# Patient Record
Sex: MALE | Race: OTHER | Employment: FULL TIME | URBAN - METROPOLITAN AREA
[De-identification: names, ages, dates, MRNs, and addresses within clinical notes are randomized per-mention and may not be internally consistent; named-entity substitution may affect disease eponyms.]

---

## 2018-05-22 ENCOUNTER — OFFICE VISIT (OUTPATIENT)
Dept: FAMILY MEDICINE CLINIC | Age: 53
End: 2018-05-22
Payer: COMMERCIAL

## 2018-05-22 VITALS
RESPIRATION RATE: 16 BRPM | BODY MASS INDEX: 29.82 KG/M2 | WEIGHT: 239.8 LBS | DIASTOLIC BLOOD PRESSURE: 64 MMHG | HEIGHT: 75 IN | HEART RATE: 72 BPM | TEMPERATURE: 98.1 F | SYSTOLIC BLOOD PRESSURE: 118 MMHG

## 2018-05-22 DIAGNOSIS — Z12.11 COLON CANCER SCREENING: ICD-10-CM

## 2018-05-22 DIAGNOSIS — L30.8 PSORIASIFORM DERMATITIS: ICD-10-CM

## 2018-05-22 DIAGNOSIS — G89.29 BILATERAL CHRONIC KNEE PAIN: ICD-10-CM

## 2018-05-22 DIAGNOSIS — Z00.00 HEALTHCARE MAINTENANCE: Primary | ICD-10-CM

## 2018-05-22 DIAGNOSIS — L91.8 CUTANEOUS SKIN TAGS: ICD-10-CM

## 2018-05-22 DIAGNOSIS — M25.562 BILATERAL CHRONIC KNEE PAIN: ICD-10-CM

## 2018-05-22 DIAGNOSIS — M25.561 BILATERAL CHRONIC KNEE PAIN: ICD-10-CM

## 2018-05-22 PROCEDURE — 99386 PREV VISIT NEW AGE 40-64: CPT | Performed by: FAMILY MEDICINE

## 2018-05-22 RX ORDER — GLUCOSAMINE SULFATE 500 MG
1 CAPSULE ORAL DAILY
COMMUNITY

## 2018-05-22 RX ORDER — MOMETASONE FUROATE 1 MG/G
CREAM TOPICAL
Qty: 45 G | Refills: 0 | Status: SHIPPED | OUTPATIENT
Start: 2018-05-22

## 2018-05-22 ASSESSMENT — PATIENT HEALTH QUESTIONNAIRE - PHQ9
1. LITTLE INTEREST OR PLEASURE IN DOING THINGS: 0
SUM OF ALL RESPONSES TO PHQ9 QUESTIONS 1 & 2: 0
SUM OF ALL RESPONSES TO PHQ QUESTIONS 1-9: 0
2. FEELING DOWN, DEPRESSED OR HOPELESS: 0

## 2018-05-30 ENCOUNTER — OFFICE VISIT (OUTPATIENT)
Dept: FAMILY MEDICINE CLINIC | Age: 53
End: 2018-05-30
Payer: COMMERCIAL

## 2018-05-30 VITALS
BODY MASS INDEX: 29.52 KG/M2 | SYSTOLIC BLOOD PRESSURE: 128 MMHG | HEIGHT: 75 IN | HEART RATE: 68 BPM | TEMPERATURE: 97.9 F | DIASTOLIC BLOOD PRESSURE: 72 MMHG | WEIGHT: 237.4 LBS

## 2018-05-30 DIAGNOSIS — R20.8 OTHER DISTURBANCES OF SKIN SENSATION: ICD-10-CM

## 2018-05-30 DIAGNOSIS — L91.8 CUTANEOUS SKIN TAGS: ICD-10-CM

## 2018-05-30 DIAGNOSIS — L91.8 INFLAMED SKIN TAG: Primary | ICD-10-CM

## 2018-05-30 DIAGNOSIS — Z00.00 HEALTHCARE MAINTENANCE: ICD-10-CM

## 2018-05-30 LAB
ALBUMIN SERPL-MCNC: 4.5 G/DL (ref 3.9–4.9)
ALP BLD-CCNC: 67 U/L (ref 35–104)
ALT SERPL-CCNC: 32 U/L (ref 0–41)
ANION GAP SERPL CALCULATED.3IONS-SCNC: 17 MEQ/L (ref 7–13)
AST SERPL-CCNC: 22 U/L (ref 0–40)
BASOPHILS ABSOLUTE: 0.1 K/UL (ref 0–0.2)
BASOPHILS RELATIVE PERCENT: 1.2 %
BILIRUB SERPL-MCNC: 0.5 MG/DL (ref 0–1.2)
BUN BLDV-MCNC: 16 MG/DL (ref 6–20)
CALCIUM SERPL-MCNC: 9.1 MG/DL (ref 8.6–10.2)
CHLORIDE BLD-SCNC: 97 MEQ/L (ref 98–107)
CHOLESTEROL, TOTAL: 248 MG/DL (ref 0–199)
CO2: 24 MEQ/L (ref 22–29)
CREAT SERPL-MCNC: 0.68 MG/DL (ref 0.7–1.2)
EOSINOPHILS ABSOLUTE: 0.2 K/UL (ref 0–0.7)
EOSINOPHILS RELATIVE PERCENT: 2.8 %
GFR AFRICAN AMERICAN: >60
GFR NON-AFRICAN AMERICAN: >60
GLOBULIN: 3.2 G/DL (ref 2.3–3.5)
GLUCOSE BLD-MCNC: 80 MG/DL (ref 74–109)
HCT VFR BLD CALC: 46.3 % (ref 42–52)
HDLC SERPL-MCNC: 52 MG/DL (ref 40–59)
HEMOGLOBIN: 15.3 G/DL (ref 14–18)
LDL CHOLESTEROL CALCULATED: 171 MG/DL (ref 0–129)
LYMPHOCYTES ABSOLUTE: 1.5 K/UL (ref 1–4.8)
LYMPHOCYTES RELATIVE PERCENT: 25.5 %
MCH RBC QN AUTO: 28.3 PG (ref 27–31.3)
MCHC RBC AUTO-ENTMCNC: 33 % (ref 33–37)
MCV RBC AUTO: 85.8 FL (ref 80–100)
MONOCYTES ABSOLUTE: 0.4 K/UL (ref 0.2–0.8)
MONOCYTES RELATIVE PERCENT: 7.2 %
NEUTROPHILS ABSOLUTE: 3.7 K/UL (ref 1.4–6.5)
NEUTROPHILS RELATIVE PERCENT: 63.3 %
PDW BLD-RTO: 14.2 % (ref 11.5–14.5)
PLATELET # BLD: 261 K/UL (ref 130–400)
POTASSIUM SERPL-SCNC: 4.6 MEQ/L (ref 3.5–5.1)
RBC # BLD: 5.4 M/UL (ref 4.7–6.1)
SODIUM BLD-SCNC: 138 MEQ/L (ref 132–144)
TOTAL PROTEIN: 7.7 G/DL (ref 6.4–8.1)
TRIGL SERPL-MCNC: 124 MG/DL (ref 0–200)
WBC # BLD: 5.8 K/UL (ref 4.8–10.8)

## 2018-05-30 PROCEDURE — 11200 RMVL SKIN TAGS UP TO&INC 15: CPT | Performed by: FAMILY MEDICINE

## 2018-05-31 DIAGNOSIS — E78.00 ELEVATED LDL CHOLESTEROL LEVEL: Primary | ICD-10-CM

## 2019-03-07 ENCOUNTER — TELEPHONE (OUTPATIENT)
Dept: FAMILY MEDICINE CLINIC | Age: 54
End: 2019-03-07

## 2021-04-29 ENCOUNTER — HOSPITAL ENCOUNTER (INPATIENT)
Age: 56
LOS: 1 days | Discharge: PSYCHIATRIC OR SUBSTANCE USE FACILITY | DRG: 885 | End: 2021-05-01
Attending: STUDENT IN AN ORGANIZED HEALTH CARE EDUCATION/TRAINING PROGRAM | Admitting: INTERNAL MEDICINE
Payer: COMMERCIAL

## 2021-04-29 ENCOUNTER — APPOINTMENT (OUTPATIENT)
Dept: CT IMAGING | Age: 56
DRG: 885 | End: 2021-04-29
Payer: COMMERCIAL

## 2021-04-29 DIAGNOSIS — R91.1 LUNG NODULE: ICD-10-CM

## 2021-04-29 DIAGNOSIS — R91.8 RIGHT LOWER LOBE LUNG MASS: ICD-10-CM

## 2021-04-29 DIAGNOSIS — R09.02 HYPOXIA: ICD-10-CM

## 2021-04-29 DIAGNOSIS — R56.9 SEIZURE (HCC): ICD-10-CM

## 2021-04-29 DIAGNOSIS — R41.0 DISORIENTATION: Primary | ICD-10-CM

## 2021-04-29 LAB
ALBUMIN SERPL-MCNC: 4.4 G/DL (ref 3.5–4.6)
ALP BLD-CCNC: 51 U/L (ref 35–104)
ALT SERPL-CCNC: 35 U/L (ref 0–41)
AMMONIA: 40 UMOL/L (ref 16–60)
AMPHETAMINE SCREEN, URINE: NORMAL
ANION GAP SERPL CALCULATED.3IONS-SCNC: 12 MEQ/L (ref 9–15)
APTT: 32.9 SEC (ref 24.4–36.8)
AST SERPL-CCNC: 25 U/L (ref 0–40)
BARBITURATE SCREEN URINE: NORMAL
BASE EXCESS ARTERIAL: 0 (ref -3–3)
BASOPHILS ABSOLUTE: 0 K/UL (ref 0–0.2)
BASOPHILS RELATIVE PERCENT: 0.4 %
BENZODIAZEPINE SCREEN, URINE: NORMAL
BILIRUB SERPL-MCNC: 0.5 MG/DL (ref 0.2–0.7)
BILIRUBIN URINE: NEGATIVE
BLOOD, URINE: NEGATIVE
BUN BLDV-MCNC: 7 MG/DL (ref 6–20)
C-REACTIVE PROTEIN, HIGH SENSITIVITY: 2.5 MG/L (ref 0–5)
CALCIUM IONIZED: 1.14 MMOL/L (ref 1.12–1.32)
CALCIUM SERPL-MCNC: 8.6 MG/DL (ref 8.5–9.9)
CANNABINOID SCREEN URINE: NORMAL
CHLORIDE BLD-SCNC: 105 MEQ/L (ref 95–107)
CK MB: 3.4 NG/ML (ref 0–6.7)
CLARITY: CLEAR
CO2: 22 MEQ/L (ref 20–31)
COCAINE METABOLITE SCREEN URINE: NORMAL
COLOR: YELLOW
CREAT SERPL-MCNC: 0.56 MG/DL (ref 0.7–1.2)
CREATINE KINASE-MB INDEX: 1.3 % (ref 0–3.5)
EOSINOPHILS ABSOLUTE: 0 K/UL (ref 0–0.7)
EOSINOPHILS RELATIVE PERCENT: 0.1 %
ETHANOL PERCENT: NORMAL G/DL
ETHANOL: <10 MG/DL (ref 0–0.08)
GFR AFRICAN AMERICAN: >60
GFR AFRICAN AMERICAN: >60
GFR NON-AFRICAN AMERICAN: >60
GFR NON-AFRICAN AMERICAN: >60
GLOBULIN: 3.1 G/DL (ref 2.3–3.5)
GLUCOSE BLD-MCNC: 125 MG/DL (ref 60–115)
GLUCOSE BLD-MCNC: 95 MG/DL (ref 70–99)
GLUCOSE URINE: NEGATIVE MG/DL
HCO3 ARTERIAL: 23.7 MMOL/L (ref 21–29)
HCT VFR BLD CALC: 43.8 % (ref 42–52)
HEMOGLOBIN: 14.5 GM/DL (ref 13.5–17.5)
HEMOGLOBIN: 15 G/DL (ref 14–18)
INFLUENZA A BY PCR: NEGATIVE
INFLUENZA B BY PCR: NEGATIVE
INR BLD: 1.1
KETONES, URINE: ABNORMAL MG/DL
LACTATE: 0.72 MMOL/L (ref 0.4–2)
LACTIC ACID: 2.2 MMOL/L (ref 0.5–2.2)
LEUKOCYTE ESTERASE, URINE: NEGATIVE
LYMPHOCYTES ABSOLUTE: 0.7 K/UL (ref 1–4.8)
LYMPHOCYTES RELATIVE PERCENT: 11.7 %
Lab: NORMAL
MAGNESIUM: 2.3 MG/DL (ref 1.7–2.4)
MCH RBC QN AUTO: 29.3 PG (ref 27–31.3)
MCHC RBC AUTO-ENTMCNC: 34.3 % (ref 33–37)
MCV RBC AUTO: 85.4 FL (ref 80–100)
METHADONE SCREEN, URINE: NORMAL
MONOCYTES ABSOLUTE: 0.4 K/UL (ref 0.2–0.8)
MONOCYTES RELATIVE PERCENT: 6.9 %
NEUTROPHILS ABSOLUTE: 4.6 K/UL (ref 1.4–6.5)
NEUTROPHILS RELATIVE PERCENT: 80.9 %
NITRITE, URINE: NEGATIVE
O2 SAT, ARTERIAL: 93 % (ref 93–100)
OPIATE SCREEN URINE: NORMAL
PCO2 ARTERIAL: 35 MM HG (ref 35–45)
PDW BLD-RTO: 13.7 % (ref 11.5–14.5)
PERFORMED ON: ABNORMAL
PH ARTERIAL: 7.44 (ref 7.35–7.45)
PH UA: 7 (ref 5–9)
PHENCYCLIDINE SCREEN URINE: NORMAL
PLATELET # BLD: 281 K/UL (ref 130–400)
PO2 ARTERIAL: 62 MM HG (ref 75–108)
POC CHLORIDE: 104 MEQ/L (ref 99–110)
POC CREATININE: 0.6 MG/DL (ref 0.9–1.3)
POC HEMATOCRIT: 43 % (ref 41–53)
POC POTASSIUM: 3.8 MEQ/L (ref 3.5–5.1)
POC SAMPLE TYPE: ABNORMAL
POC SODIUM: 139 MEQ/L (ref 136–145)
POTASSIUM SERPL-SCNC: 4 MEQ/L (ref 3.4–4.9)
PRO-BNP: 70 PG/ML
PROCALCITONIN: <0.02 NG/ML (ref 0–0.15)
PROPOXYPHENE SCREEN, URINE: NORMAL
PROTEIN UA: ABNORMAL MG/DL
PROTHROMBIN TIME: 14.4 SEC (ref 12.3–14.9)
RBC # BLD: 5.13 M/UL (ref 4.7–6.1)
REJECTED TEST: NORMAL
SARS-COV-2, NAAT: NOT DETECTED
SODIUM BLD-SCNC: 139 MEQ/L (ref 135–144)
SPECIFIC GRAVITY UA: 1.02 (ref 1–1.03)
STREP GRP A PCR: NEGATIVE
TCO2 ARTERIAL: 25 (ref 22–29)
TOTAL CK: 259 U/L (ref 0–190)
TOTAL PROTEIN: 7.5 G/DL (ref 6.3–8)
TROPONIN: <0.01 NG/ML (ref 0–0.01)
TSH SERPL DL<=0.05 MIU/L-ACNC: 0.87 UIU/ML (ref 0.44–3.86)
UR OXYCODONE RAPID SCREEN: NORMAL
URINE REFLEX TO CULTURE: ABNORMAL
UROBILINOGEN, URINE: 0.2 E.U./DL
WBC # BLD: 5.7 K/UL (ref 4.8–10.8)

## 2021-04-29 PROCEDURE — 6360000002 HC RX W HCPCS: Performed by: NURSE PRACTITIONER

## 2021-04-29 PROCEDURE — 93005 ELECTROCARDIOGRAM TRACING: CPT

## 2021-04-29 PROCEDURE — 87040 BLOOD CULTURE FOR BACTERIA: CPT

## 2021-04-29 PROCEDURE — 87651 STREP A DNA AMP PROBE: CPT

## 2021-04-29 PROCEDURE — 6360000004 HC RX CONTRAST MEDICATION: Performed by: STUDENT IN AN ORGANIZED HEALTH CARE EDUCATION/TRAINING PROGRAM

## 2021-04-29 PROCEDURE — 82550 ASSAY OF CK (CPK): CPT

## 2021-04-29 PROCEDURE — 83735 ASSAY OF MAGNESIUM: CPT

## 2021-04-29 PROCEDURE — 82803 BLOOD GASES ANY COMBINATION: CPT

## 2021-04-29 PROCEDURE — 36600 WITHDRAWAL OF ARTERIAL BLOOD: CPT

## 2021-04-29 PROCEDURE — 84295 ASSAY OF SERUM SODIUM: CPT

## 2021-04-29 PROCEDURE — 85730 THROMBOPLASTIN TIME PARTIAL: CPT

## 2021-04-29 PROCEDURE — 96374 THER/PROPH/DIAG INJ IV PUSH: CPT

## 2021-04-29 PROCEDURE — 81003 URINALYSIS AUTO W/O SCOPE: CPT

## 2021-04-29 PROCEDURE — 85610 PROTHROMBIN TIME: CPT

## 2021-04-29 PROCEDURE — 36415 COLL VENOUS BLD VENIPUNCTURE: CPT

## 2021-04-29 PROCEDURE — 71275 CT ANGIOGRAPHY CHEST: CPT

## 2021-04-29 PROCEDURE — 85014 HEMATOCRIT: CPT

## 2021-04-29 PROCEDURE — G0378 HOSPITAL OBSERVATION PER HR: HCPCS

## 2021-04-29 PROCEDURE — 84443 ASSAY THYROID STIM HORMONE: CPT

## 2021-04-29 PROCEDURE — 82553 CREATINE MB FRACTION: CPT

## 2021-04-29 PROCEDURE — 82565 ASSAY OF CREATININE: CPT

## 2021-04-29 PROCEDURE — 84132 ASSAY OF SERUM POTASSIUM: CPT

## 2021-04-29 PROCEDURE — 82140 ASSAY OF AMMONIA: CPT

## 2021-04-29 PROCEDURE — 99285 EMERGENCY DEPT VISIT HI MDM: CPT

## 2021-04-29 PROCEDURE — 94664 DEMO&/EVAL PT USE INHALER: CPT

## 2021-04-29 PROCEDURE — 87635 SARS-COV-2 COVID-19 AMP PRB: CPT

## 2021-04-29 PROCEDURE — 70450 CT HEAD/BRAIN W/O DYE: CPT

## 2021-04-29 PROCEDURE — 82435 ASSAY OF BLOOD CHLORIDE: CPT

## 2021-04-29 PROCEDURE — 84484 ASSAY OF TROPONIN QUANT: CPT

## 2021-04-29 PROCEDURE — 83880 ASSAY OF NATRIURETIC PEPTIDE: CPT

## 2021-04-29 PROCEDURE — 84145 PROCALCITONIN (PCT): CPT

## 2021-04-29 PROCEDURE — 80306 DRUG TEST PRSMV INSTRMNT: CPT

## 2021-04-29 PROCEDURE — 86141 C-REACTIVE PROTEIN HS: CPT

## 2021-04-29 PROCEDURE — 2580000003 HC RX 258: Performed by: STUDENT IN AN ORGANIZED HEALTH CARE EDUCATION/TRAINING PROGRAM

## 2021-04-29 PROCEDURE — 82330 ASSAY OF CALCIUM: CPT

## 2021-04-29 PROCEDURE — 83605 ASSAY OF LACTIC ACID: CPT

## 2021-04-29 PROCEDURE — 85025 COMPLETE CBC W/AUTO DIFF WBC: CPT

## 2021-04-29 PROCEDURE — 82077 ASSAY SPEC XCP UR&BREATH IA: CPT

## 2021-04-29 PROCEDURE — 80053 COMPREHEN METABOLIC PANEL: CPT

## 2021-04-29 PROCEDURE — 87502 INFLUENZA DNA AMP PROBE: CPT

## 2021-04-29 RX ORDER — FOLIC ACID 1 MG/1
1 TABLET ORAL DAILY
Status: DISCONTINUED | OUTPATIENT
Start: 2021-04-29 | End: 2021-05-01 | Stop reason: HOSPADM

## 2021-04-29 RX ORDER — LORAZEPAM 1 MG/1
4 TABLET ORAL
Status: DISCONTINUED | OUTPATIENT
Start: 2021-04-29 | End: 2021-05-01 | Stop reason: HOSPADM

## 2021-04-29 RX ORDER — LORAZEPAM 2 MG/ML
3 INJECTION INTRAMUSCULAR
Status: DISCONTINUED | OUTPATIENT
Start: 2021-04-29 | End: 2021-05-01 | Stop reason: HOSPADM

## 2021-04-29 RX ORDER — HALOPERIDOL 5 MG/ML
0.5 INJECTION INTRAMUSCULAR EVERY 30 MIN PRN
Status: ACTIVE | OUTPATIENT
Start: 2021-04-29 | End: 2021-04-30

## 2021-04-29 RX ORDER — SODIUM CHLORIDE 9 MG/ML
25 INJECTION, SOLUTION INTRAVENOUS PRN
Status: DISCONTINUED | OUTPATIENT
Start: 2021-04-29 | End: 2021-05-01 | Stop reason: HOSPADM

## 2021-04-29 RX ORDER — LORAZEPAM 2 MG/ML
2 INJECTION INTRAMUSCULAR
Status: DISCONTINUED | OUTPATIENT
Start: 2021-04-29 | End: 2021-05-01 | Stop reason: HOSPADM

## 2021-04-29 RX ORDER — LORAZEPAM 1 MG/1
3 TABLET ORAL
Status: DISCONTINUED | OUTPATIENT
Start: 2021-04-29 | End: 2021-05-01 | Stop reason: HOSPADM

## 2021-04-29 RX ORDER — 0.9 % SODIUM CHLORIDE 0.9 %
1000 INTRAVENOUS SOLUTION INTRAVENOUS ONCE
Status: COMPLETED | OUTPATIENT
Start: 2021-04-29 | End: 2021-04-29

## 2021-04-29 RX ORDER — POLYETHYLENE GLYCOL 3350 17 G/17G
17 POWDER, FOR SOLUTION ORAL DAILY PRN
Status: DISCONTINUED | OUTPATIENT
Start: 2021-04-29 | End: 2021-05-01 | Stop reason: HOSPADM

## 2021-04-29 RX ORDER — SODIUM CHLORIDE 0.9 % (FLUSH) 0.9 %
5-40 SYRINGE (ML) INJECTION EVERY 12 HOURS SCHEDULED
Status: DISCONTINUED | OUTPATIENT
Start: 2021-04-29 | End: 2021-05-01 | Stop reason: HOSPADM

## 2021-04-29 RX ORDER — ACETAMINOPHEN 325 MG/1
650 TABLET ORAL EVERY 6 HOURS PRN
Status: DISCONTINUED | OUTPATIENT
Start: 2021-04-29 | End: 2021-05-01 | Stop reason: HOSPADM

## 2021-04-29 RX ORDER — ONDANSETRON 2 MG/ML
4 INJECTION INTRAMUSCULAR; INTRAVENOUS EVERY 6 HOURS PRN
Status: DISCONTINUED | OUTPATIENT
Start: 2021-04-29 | End: 2021-05-01 | Stop reason: HOSPADM

## 2021-04-29 RX ORDER — LORAZEPAM 2 MG/ML
1 INJECTION INTRAMUSCULAR
Status: DISCONTINUED | OUTPATIENT
Start: 2021-04-29 | End: 2021-05-01 | Stop reason: HOSPADM

## 2021-04-29 RX ORDER — LORAZEPAM 2 MG/ML
4 INJECTION INTRAMUSCULAR
Status: DISCONTINUED | OUTPATIENT
Start: 2021-04-29 | End: 2021-05-01 | Stop reason: HOSPADM

## 2021-04-29 RX ORDER — SODIUM CHLORIDE 0.9 % (FLUSH) 0.9 %
5-40 SYRINGE (ML) INJECTION PRN
Status: DISCONTINUED | OUTPATIENT
Start: 2021-04-29 | End: 2021-05-01 | Stop reason: HOSPADM

## 2021-04-29 RX ORDER — ALBUTEROL SULFATE 2.5 MG/3ML
2.5 SOLUTION RESPIRATORY (INHALATION)
Status: DISCONTINUED | OUTPATIENT
Start: 2021-04-29 | End: 2021-05-01 | Stop reason: HOSPADM

## 2021-04-29 RX ORDER — PROMETHAZINE HYDROCHLORIDE 12.5 MG/1
12.5 TABLET ORAL EVERY 6 HOURS PRN
Status: DISCONTINUED | OUTPATIENT
Start: 2021-04-29 | End: 2021-05-01 | Stop reason: HOSPADM

## 2021-04-29 RX ORDER — ACETAMINOPHEN 650 MG/1
650 SUPPOSITORY RECTAL EVERY 6 HOURS PRN
Status: DISCONTINUED | OUTPATIENT
Start: 2021-04-29 | End: 2021-05-01 | Stop reason: HOSPADM

## 2021-04-29 RX ORDER — LORAZEPAM 1 MG/1
2 TABLET ORAL
Status: DISCONTINUED | OUTPATIENT
Start: 2021-04-29 | End: 2021-05-01 | Stop reason: HOSPADM

## 2021-04-29 RX ORDER — LANOLIN ALCOHOL/MO/W.PET/CERES
100 CREAM (GRAM) TOPICAL DAILY
Status: DISCONTINUED | OUTPATIENT
Start: 2021-04-29 | End: 2021-05-01 | Stop reason: HOSPADM

## 2021-04-29 RX ORDER — M-VIT,TX,IRON,MINS/CALC/FOLIC 27MG-0.4MG
1 TABLET ORAL DAILY
Status: DISCONTINUED | OUTPATIENT
Start: 2021-04-29 | End: 2021-05-01 | Stop reason: HOSPADM

## 2021-04-29 RX ORDER — LORAZEPAM 1 MG/1
1 TABLET ORAL
Status: DISCONTINUED | OUTPATIENT
Start: 2021-04-29 | End: 2021-05-01 | Stop reason: HOSPADM

## 2021-04-29 RX ADMIN — SODIUM CHLORIDE 1000 ML: 9 INJECTION, SOLUTION INTRAVENOUS at 12:13

## 2021-04-29 RX ADMIN — IOPAMIDOL 100 ML: 612 INJECTION, SOLUTION INTRAVENOUS at 12:42

## 2021-04-29 RX ADMIN — LORAZEPAM 2 MG: 2 INJECTION INTRAMUSCULAR; INTRAVENOUS at 20:42

## 2021-04-29 ASSESSMENT — ENCOUNTER SYMPTOMS
RESPIRATORY NEGATIVE: 1
GASTROINTESTINAL NEGATIVE: 1
ALLERGIC/IMMUNOLOGIC NEGATIVE: 1
EYES NEGATIVE: 1

## 2021-04-29 NOTE — PROGRESS NOTES
[facility] AT [place] Respiratory Therapy Evaluation   Current Order:  Albuterol q2 prn     Home Regimen:  none    Ordering Physician: ruben  Re-evaluation Date:5/2     Diagnosis: confusion    Patient Status: Stable / Unstable + Physician notified    The following MDI Criteria must be met in order to convert aerosol to MDI with spacer. If unable to meet, MDI will be converted to aerosol:  []  Patient able to demonstrate the ability to use MDI effectively  []  Patient alert and cooperative  []  Patient able to take deep breath with 5-10 second hold  []  Medication(s) available in this delivery method   []  Peak flow greater than or equal to 200 ml/min            Current Order Substituted To  (same drug, same frequency)   Aerosol to MDI [] Albuterol Sulfate 0.083% unit dose by aerosol Albuterol Sulfate MDI 2 puffs by inhalation with spacer    [] Levalbuterol 1.25 mg unit dose by aerosol Levalbuterol MDI 2 puffs by inhalation with spacer    [] Levalbuterol 0.63 mg unit dose by aerosol Levalbuterol MDI 2 puffs by inhalation with spacer    [] Ipratropium Bromide 0.02% unit dose by aerosol Ipratropium Bromide MDI 2 puffs by inhalation with spacer    [] Duoneb (Ipratropium + Albuterol) unit dose by aerosol Ipratropium MDI + Albuterol MDI 2 puffs by inhalation w/spacer   MDI to Aerosol [] Albuterol Sulfate MDI Albuterol Sulfate 0.083% unit dose by aerosol    [] Levalbuterol MDI 2 puffs by inhalation Levalbuterol 1.25 mg unit dose by aerosol    [] Ipratropium Bromide MDI by inhalation Ipratropium Bromide 0.02% unit dose by aerosol    [] Combivent (Ipratropium + Albuterol) MDI by inhalation Duoneb (Ipratropium + Albuterol) unit dose by aerosol       Treatment Assessment [Frequency/Schedule]:  Change frequency to: _________________________ Freq Q4prn_________________________per Protocol, P&T, MEC      Points 0 1 2 3 4   Pulmonary Status  Non-Smoker  [x]   Smoking history   < 20 pack years  []   Smoking history  ?  20 pack years  [] Pulmonary Disorder  (acute or chronic)  []   Severe or Chronic w/ Exacerbation  []     Surgical Status No [x]   Surgeries     General []   Surgery Lower []   Abdominal Thoracic or []   Upper Abdominal Thoracic with  PulmonaryDisorder  []     Chest X-ray Clear/Not  Ordered     [x]  Chronic Changes  Results Pending  []  Infiltrates, atelectasis, pleural effusion, or edema  []  Infiltrates in more than one lobe []  Infiltrate + Atelectasis, &/or pleural effusion  []    Respiratory Pattern Regular,  RR = 12-20 [x]  Increased,  RR = 21-25 []  DELCID, irregular,  or RR = 26-30 []  Decreased FEV1  or RR = 31-35 []  Severe SOB, use  of accessory muscles, or RR ? 35  []    Mental Status Alert, oriented,  Cooperative []  Confused but Follows commands [x]  Lethargic or unable to follow commands []  Obtunded  []  Comatose  []    Breath Sounds Clear to  auscultation  [x]  Decreased unilaterally or  in bases only []  Decreased  bilaterally  []  Crackles or intermittent wheezes []  Wheezes []    Cough Strong, Spontan., & nonproductive [x]  Strong,  spontaneous, &  productive []  Weak,  Nonproductive []  Weak, productive or  with wheezes []  No spontaneous  cough or may require suctioning []    Level of Activity Ambulatory []  Ambulatory w/ Assist  [x]  Non-ambulatory []  Paraplegic []  Quadriplegic []    Total5    Score:___2____     Triage Score:___5_____      Tri       Triage:     1. (>20) Freq: Q3    2. (16-20) Freq: Q4   3. (11-15) Freq: QID & Albuterol Q2 PRN    4. (6-10) Freq: TID & Albuterol Q2 PRN    5. (0-5) Freq Q4prn

## 2021-04-29 NOTE — ED NOTES
Bed: 06  Expected date:   Expected time:   Means of arrival:   Comments:  55M EVAL, POSSIBLE DRUG USE, 159/84, 96RA, 94NSR, A&OX2

## 2021-04-29 NOTE — ED NOTES
Patient is Aox4. The patient states he does not rember the details of what happened. He states he has been overwhelmed at work. He claims he is in fear of losing his job because he thinks  His coworker is trying to get him fired. The patient states he is not feeling suicidal at all. He says he remembers feeling very overwhelmed he woke up for work this morning. He then goes on to day he was getting ready for work when it [employer] like a movie started playing in my head on a loop, every ten minutes. It was like I walked around all morning like a zombie because all I could see was that movie playing. \" The patient claims this has never happened to him. Dr. Rose Marie Landon informed.       Modesta Hernández RN  04/29/21 2007

## 2021-04-29 NOTE — H&P
Hospitalist History and Physical  Name: Johann Poon  Age: 50 y.o. Gender: male  CodeStatus: No Order  Allergies: No Known Allergies    Chief Complaint:Psychiatric Evaluation    Primary Care Provider: Rebecca Epstein MD  InpatientTreatment Team: Treatment Team: Attending Provider: Stanley Barnett DO  Admission Date: 4/29/2021      Subjective: Patient is a 62-year-old male with past medical history of PVD and benign tumors who presented to the emergency room for psychiatric evaluation. Patient was accompanied by [employer], after patient presented to the police station with gone in hand stating he wanted to end his life. Per report, patient displayed bizarre behavior and altered mental status. On arrival patient did not answer questions approprietly. Drug use vs seizure vs CVA was suspected followed by Full workup including Brain CT, drug tox screen, ETOH level, CMP, CBC, sepsis labs grossly unremarkable; however, Chest CT showed right lower lobe mass measuring  9.5 x 1.8 cm in the transverse and AP dimension. Patient reports previous right sided lung mass removed approximately 10 years ago. While in emergency room patient abruptly became alert and oriented to self, place time. He does not recall the details of events today, states he has been walking around like a zombie. He admits to occasional alcohol use, smoking cigars but abusing caffeine products and sleeping maybe 2 hours a night. He currently denies suicidal ideation but does endorse feeling stressed and overwhelmed. Patient denies headache, chest pain, shortness of breath, N/V/D/C, fever/chills. He is hemodynamically stable      Physical Exam  Constitutional:       Appearance: Normal appearance. HENT:      Head: Normocephalic and atraumatic. Right Ear: External ear normal.      Left Ear: External ear normal.      Nose: Nose normal.      Mouth/Throat:      Mouth: Mucous membranes are dry. Pharynx: Oropharynx is clear.    Eyes: Extraocular Movements: Extraocular movements intact. Conjunctiva/sclera: Conjunctivae normal.      Pupils: Pupils are equal, round, and reactive to light. Neck:      Musculoskeletal: Normal range of motion and neck supple. Cardiovascular:      Rate and Rhythm: Normal rate and regular rhythm. Pulses: Normal pulses. Heart sounds: Normal heart sounds. Pulmonary:      Effort: Pulmonary effort is normal.      Breath sounds: Normal breath sounds. Abdominal:      General: Bowel sounds are normal.      Palpations: Abdomen is soft. Musculoskeletal: Normal range of motion. Skin:     General: Skin is warm and dry. Capillary Refill: Capillary refill takes less than 2 seconds. Neurological:      General: No focal deficit present. Mental Status: He is alert and oriented to person, place, and time. Psychiatric:         Mood and Affect: Mood normal.         Review of Systems   Constitutional: Negative. HENT: Negative. Eyes: Negative. Respiratory: Negative. Cardiovascular: Negative. Gastrointestinal: Negative. Endocrine: Negative. Genitourinary: Negative. Musculoskeletal: Negative. Skin: Negative. Allergic/Immunologic: Negative. Neurological: Negative. Hematological: Negative. Psychiatric/Behavioral: Positive for behavioral problems, confusion, decreased concentration, dysphoric mood and sleep disturbance. Medications:  Reviewed     No current facility-administered medications on file prior to encounter. Current Outpatient Medications on File Prior to Encounter   Medication Sig Dispense Refill    Glucosamine 500 MG CAPS Take 1 capsule by mouth daily      mometasone (ELOCON) 0.1 % cream Apply topically twice daily. 45 g 0    Multiple Vitamin (MULTIVITAMIN PO) Take 1 tablet by mouth daily           History reviewed. No pertinent past medical history.     Past Surgical History:   Procedure Laterality Date    HERNIA REPAIR  2011    inguinal    TUMOR EXCISION  2003    Right lung Lining        No family history on file. Social History     Socioeconomic History    Marital status:      Spouse name: Not on file    Number of children: Not on file    Years of education: Not on file    Highest education level: Not on file   Occupational History    Not on file   Social Needs    Financial resource strain: Not on file    Food insecurity     Worry: Not on file     Inability: Not on file    Transportation needs     Medical: Not on file     Non-medical: Not on file   Tobacco Use    Smoking status: Never Smoker    Smokeless tobacco: Never Used   Substance and Sexual Activity    Alcohol use: Yes     Alcohol/week: 5.0 standard drinks     Types: 5 Cans of beer per week    Drug use: No    Sexual activity: Not on file   Lifestyle    Physical activity     Days per week: Not on file     Minutes per session: Not on file    Stress: Not on file   Relationships    Social connections     Talks on phone: Not on file     Gets together: Not on file     Attends Taoist service: Not on file     Active member of club or organization: Not on file     Attends meetings of clubs or organizations: Not on file     Relationship status: Not on file    Intimate partner violence     Fear of current or ex partner: Not on file     Emotionally abused: Not on file     Physically abused: Not on file     Forced sexual activity: Not on file   Other Topics Concern    Not on file   Social History Narrative    Not on file        Infusion Medications:   Scheduled Medications:   PRN Meds:     Labs:   Recent Labs     04/29/21  1200 04/29/21  1256   WBC 5.7  --    HGB 15.0 14.5   HCT 43.8  --      --      Recent Labs     04/29/21  1256   CREATININE 0.6*     No results for input(s): AST, ALT, BILIDIR, BILITOT, ALKPHOS in the last 72 hours.   Recent Labs     04/29/21  1200   INR 1.1     Recent Labs     04/29/21  1200   TROPONINI <0.010       Urinalysis:   Lab Results Component Value Date    NITRU Negative 04/29/2021    BLOODU Negative 04/29/2021    SPECGRAV 1.017 04/29/2021    GLUCOSEU Negative 04/29/2021       Radiology:   Most recent    Chest CT      WITH CONTRAST:No results found for this or any previous visit. WITHOUT CONTRAST: No results found for this or any previous visit. CXR      2-view: No results found for this or any previous visit. Portable: No results found for this or any previous visit. Echo No results found for this or any previous visit. Assessment/Plan:    Acute encephalopathy secondary to caffeine abuse and insomnia: Patient currently alert and oriented x3. Neurology consulted, MRI ordered. Monitor BP closely. Neuro checks Q4hrs. Fall precautions. Seizure precautions. Suicidal Ideation: Suicide precautions, Psych consulted. Lung Mass: Pleural-based spiculated mass at the lateral aspect of the right lower lobe series 3 image 33. It measures 9.5 x 1.8 cm. History of lung mass and right lower lobe. Removed approximately 10 years ago. Pulmonology consulted to establish care. I personally spent estimated 60 minutes with this patient today. Additional work up or/and treatment plan may be added today or then after based on clinical progression. I am managing a portion of pt care. Some medical issues are handled by other specialists. Additional work up and treatment should be done in out pt setting by pt PCP and other out pt providers. In addition to examining and evaluating pt, I spent additional time explaining care, normaland abnormal findings, and treatment plan. All of pt questions were answered. Counseling, diet and education were provided. Case will be discussed with nursing staff when appropriate. Family will be updated if and when appropriate.       Electronically signed by TE Schwartz CNP on 4/29/2021 at 2:52 PM

## 2021-04-29 NOTE — ED PROVIDER NOTES
[address] [facility] ED  eMERGENCY dEPARTMENT eNCOUnter      Pt Name: Gabriel Stanley  MRN: [id number]  [place] 1965  Date of evaluation: 4/29/2021  Provider: Idania Alarcon DO    CHIEF COMPLAINT       Chief Complaint   Patient presents with    Psychiatric Evaluation         HISTORY OF PRESENT ILLNESS   (Location/Symptom, Timing/Onset,Context/Setting, Quality, Duration, Modifying Factors, Severity)  Note limiting factors. Gabriel Stanley is a 50 y.o. male who presents to the emergency department with complaint of altered mental status. Went to the [facility] police station and told him to [place] him. This was reported by EMS. Patient was disoriented in the emergency room and confused. He was not able to follow commands but would say things that did not make any sense. When asked for his name he states you know what it is. Patient's pulse ox was 91% with clear lungs. Patient is awake alert but not oriented. Patient felt warm and had a temperature of 100.0 temporally. Patient was uncooperative oral temperature leaving his mouth open. Patient has no report of any psychiatric history. ER physician asked for the patient have a medical work-up and felt that it would not be appropriate for psych to see the patient being that the ER physician is concerned about a medical Tiff illness. During the course of the patient's work-up the patient was completely lucid and able to talk. The history is provided by the EMS personnel. NursingNotes were reviewed. REVIEW OF SYSTEMS    (2-9 systems for level 4, 10 or more for level 5)     Review of Systems   Unable to perform ROS: Mental status change   Psychiatric/Behavioral: Positive for confusion. Except as noted above the remainder of the review of systems was reviewed and negative. PAST MEDICAL HISTORY   History reviewed. No pertinent past medical history.       SURGICALHISTORY       Past Surgical History:   Procedure Laterality Date    HERNIA REPAIR  2011    inguinal    TUMOR EXCISION  2003    Right lung Lining         CURRENT MEDICATIONS       Previous Medications    GLUCOSAMINE 500 MG CAPS    Take 1 capsule by mouth daily    MOMETASONE (ELOCON) 0.1 % CREAM    Apply topically twice daily. MULTIPLE VITAMIN (MULTIVITAMIN PO)    Take 1 tablet by mouth daily        ALLERGIES     Patient has no known allergies. FAMILY HISTORY     No family history on file. SOCIAL HISTORY       Social History     Socioeconomic History    Marital status:      Spouse name: None    Number of children: None    Years of education: None    Highest education level: None   Occupational History    None   Social Needs    Financial resource strain: None    Food insecurity     Worry: None     Inability: None    Transportation needs     Medical: None     Non-medical: None   Tobacco Use    Smoking status: Never Smoker    Smokeless tobacco: Never Used   Substance and Sexual Activity    Alcohol use:  Yes     Alcohol/week: 5.0 standard drinks     Types: 5 Cans of beer per week    Drug use: No    Sexual activity: None   Lifestyle    Physical activity     Days per week: None     Minutes per session: None    Stress: None   Relationships    Social connections     Talks on phone: None     Gets together: None     Attends Taoist service: None     Active member of club or organization: None     Attends meetings of clubs or organizations: None     Relationship status: None    Intimate partner violence     Fear of current or ex partner: None     Emotionally abused: None     Physically abused: None     Forced sexual activity: None   Other Topics Concern    None   Social History Narrative    None       SCREENINGS    Herbert Coma Scale  Eye Opening: Spontaneous  Best Verbal Response: Inappropriate words  Best Motor Response: Obeys commands  Herbert Coma Scale Score: 13 @FLOW([id number])@      PHYSICAL EXAM    (up to 7 for level 4, 8 or more for level 5)     ED Triage Vitals [04/29/21 1135]   BP Temp Temp Source Pulse Resp SpO2 Height Weight   (!) 160/82 99 °F (37.2 °C) Oral 77 14 93 % 6' 1\" (1.854 m) 230 lb (104.3 kg)       Physical Exam  Vitals signs and nursing note reviewed. Constitutional:       General: He is awake. He is not in acute distress. Appearance: Normal appearance. He is well-developed and normal weight. He is not ill-appearing, toxic-appearing or diaphoretic. Comments: No photophobia. No phonophobia. HENT:      Head: Normocephalic and atraumatic. No Carson's sign. Right Ear: External ear normal.      Left Ear: External ear normal.      Nose: Nose normal. No congestion or rhinorrhea. Mouth/Throat:      Mouth: Mucous membranes are moist.      Pharynx: Oropharynx is clear. No oropharyngeal exudate or posterior oropharyngeal erythema. Eyes:      General: No scleral icterus. Right eye: No foreign body or discharge. Left eye: No discharge. Extraocular Movements: Extraocular movements intact. Conjunctiva/sclera: Conjunctivae normal.      Left eye: No exudate. Pupils: Pupils are equal, round, and reactive to light. Neck:      Musculoskeletal: Normal range of motion and neck supple. No neck rigidity. Vascular: No JVD. Trachea: No tracheal deviation. Comments: No meningismus. Cardiovascular:      Rate and Rhythm: Normal rate and regular rhythm. Pulses: Normal pulses. Heart sounds: Normal heart sounds. Heart sounds not distant. No murmur. No friction rub. No gallop. Pulmonary:      Effort: Pulmonary effort is normal. No respiratory distress. Breath sounds: Normal breath sounds. No stridor. No wheezing, rhonchi or rales. Chest:      Chest wall: No tenderness. Abdominal:      General: Abdomen is flat. Bowel sounds are normal. There is no distension. Palpations: Abdomen is soft. There is no mass. Tenderness: There is no abdominal tenderness.  There is no right CVA tenderness, left CVA tenderness, guarding or rebound. Hernia: No hernia is present. Musculoskeletal: Normal range of motion. General: No swelling, tenderness, deformity or signs of injury. Lymphadenopathy:      Head:      Right side of head: No submental adenopathy. Left side of head: No submental adenopathy. Skin:     General: Skin is warm and dry. Capillary Refill: Capillary refill takes less than 2 seconds. Coloration: Skin is not jaundiced or pale. Findings: No bruising, erythema, lesion or rash. Neurological:      General: No focal deficit present. Mental Status: He is alert. He is disoriented. GCS: GCS eye subscore is 4. GCS verbal subscore is 4. GCS motor subscore is 5. Cranial Nerves: Cranial nerves are intact. No cranial nerve deficit or facial asymmetry. Sensory: Sensation is intact. No sensory deficit. Motor: Motor function is intact. No weakness. Coordination: Coordination normal.      Deep Tendon Reflexes: Reflexes are normal and symmetric. Reflex Scores:       Bicep reflexes are 2+ on the right side and 2+ on the left side. Patellar reflexes are 2+ on the right side and 2+ on the left side. Comments: Moves all 4 extremities purposefully. Patient has 5 out of 5 muscle strength bilateral upper and lower extremities. No truncal ataxia. Psychiatric:         Mood and Affect: Mood normal.         Behavior: Behavior normal. Behavior is cooperative. Thought Content: Thought content normal.         Judgment: Judgment normal.         DIAGNOSTIC RESULTS     EKG: All EKG's are interpreted by the Emergency Department Physician who either signs or Co-signsthis chart in the absence of a cardiologist.    EKG: Sinus rhythm at 74 bpm.  Inverted T waves in leads III, aVF, aVR, V1, V2, V3, V4, and V5. There are no PVCs. LVH voltage.     RADIOLOGY:   Non-plain filmimages such as CT, Ultrasound and MRI are read by the natividad Doyle radiographic images are visualized and preliminarily interpreted by the emergency physician with the below findings:        Interpretation per the Radiologist below, if available at the time ofthis note:    CTA CHEST [address]   Final Result   1. IMPRESSION:   2.  NO EVIDENCE OF CENTRAL OR SEGMENTAL PULMONARY EMBOLISM. 3.  THERE IS A PLEURAL-BASED MASS WITH SPICULATED APPEARANCE IN THE LATERAL ASPECT OF THE RIGHT LOWER LOBE. WILL NEED TO BE FOLLOW-UP TO FURTHER EVALUATE AS PER CRITERIA BELOW. 4.  VERY FAINT AREA OF GROUNDGLASS INFILTRATE WITHIN THE BASE THE RIGHT UPPER LOBE.         ____________________________________________________________      Fleischner Society 2017 Guidelines for Management of Incidentally Detected Pulmonary Nodules in Adults      A: Solid Nodules*           Single                     Low risk**                        <6 mm     No routine follow-up                                  6-8 mm     CT at 6-12 months, then consider CT at 18-24 months                    >8 mm     Consider CT at 3 months, PET/CT, or tissue sampling   Nodules <6 mm do not require routine follow-up, but certain patients at high risk with suspicious nodule morphology, upper lobe location, or both may warrant 12-month follow-up (recommendation 1A). High risk**                   <6 mm     Optional CT at 12 months                  6-8 mm     CT at 6-12 months, then CT at 18-24 months                    >8 mm     Consider CT at 3 months, PET/CT, or tissue sampling   Nodules <6 mm do not require routine follow-up, but certain patients at high risk with suspicious nodule morphology, upper lobe location, or both may warrant 12-month follow-up (recommendation 1A).            Multiple                              Low risk**                   <6 mm     No routine follow-up                  6-8 mm     CT at 3-6 months, then consider CT at 18-24 months                   >8 mm     CT at 3-6 are average of long and short axes, rounded to the nearest millimeter. ** Consider all relevant risk factors (see Risk Factors). ____________________________________________________________      All CT scans at this facility use dose modulation, iterative reconstruction, and/or weight based dosing when appropriate to reduce radiation dose to as low as reasonably achievable. CT HEAD WO CONTRAST   Final Result      NO ACUTE INTRACRANIAL PROCESS IDENTIFIED. ED BEDSIDE ULTRASOUND:   Performed by ED Physician - none    LABS:  Labs Reviewed   CBC WITH AUTO DIFFERENTIAL - Abnormal; Notable for the following components:       Result Value    Lymphocytes Absolute 0.7 (*)     All other components within normal limits   URINE RT REFLEX TO CULTURE - Abnormal; Notable for the following components:    Ketones, Urine TRACE (*)     Protein, UA TRACE (*)     All other components within normal limits   COMPREHENSIVE METABOLIC PANEL - Abnormal; Notable for the following components:    CREATININE 0.56 (*)     All other components within normal limits    Narrative:     Redraw   CK - Abnormal; Notable for the following components:     Total  (*)     All other components within normal limits    Narrative:     Redraw   POCT ARTERIAL - Abnormal; Notable for the following components:    POC Glucose 125 (*)     POC Creatinine 0.6 (*)     pO2, Arterial 62 (*)     O2 Sat, Arterial 93 (*)     All other components within normal limits    Narrative:     CALL  [facility]  [facility] tel. [phone],   COVID-19, RAPID   RAPID INFLUENZA A/B ANTIGENS   RAPID STREP SCREEN   CULTURE, BLOOD 1   CULTURE, BLOOD 2   ETHANOL   AMMONIA   APTT   HIGH SENSITIVITY CRP   BRAIN NATRIURETIC PEPTIDE   LACTIC ACID, PLASMA   PROTIME-INR   TROPONIN   TSH WITHOUT REFLEX   PROCALCITONIN   URINE DRUG SCREEN, COMPREHENSIVE   SPECIMEN REJECTION   MAGNESIUM    Narrative:     Redraw   CKMB & RELATIVE PERCENT   POCT EPOC BLOOD GAS, LACTIC ACID, ICA All other labs were within normal range or not returned as of this dictation. EMERGENCY DEPARTMENT COURSE and DIFFERENTIAL DIAGNOSIS/MDM:   Vitals:    Vitals:    04/29/21 1215 04/29/21 1257 04/29/21 1353 04/29/21 1514   BP: (!) 151/89  136/87 126/77   Pulse: 72  74 73   Resp: 13 23 18 19   Temp:   99.8 °F (37.7 °C)    TempSrc:       SpO2: 97% 94% 98% 98%   Weight:       Height:               MDM  Patient came in confused no recollection of prior event. Spiculated lung mass. Neuro consult was obtained and Dr. More Sanchez recommends hospitalization for further work-up including IV contrasted MRI of the brain to be done by the hospitalist.  Concerned that the patient could have mets to the brain or other pathology. On reexam the patient is completely awake alert oriented x4 with no deficits whatsoever. His GCS is now 15 from 11. Findings were discussed with the patient his brother and he will have further work-up. Talk screen is negative. CT the chest shows no PE. Case was discussed with the hospitalist who accepted the patient to his service. CONSULTS:  IP CONSULT TO NEUROLOGY  IP CONSULT TO NEUROLOGY  IP CONSULT TO PSYCHIATRY  IP CONSULT TO PULMONOLOGY    PROCEDURES:  Unless otherwise noted below, none     Procedures    FINAL IMPRESSION      1. Disorientation    2. Hypoxia    3. Right lower lobe lung mass    4. Seizure [facility])          DISPOSITION/PLAN   DISPOSITION Admitted 04/29/2021 02:23:05 PM      PATIENT REFERRED TO:  No follow-up provider specified.     DISCHARGE MEDICATIONS:  New Prescriptions    No medications on file          (Please note that portions of this note were completed with a voice recognition program.  Efforts were made to edit the dictations but occasionally words are mis-transcribed.)    Brie Gerber DO (electronically signed)  Attending Emergency Physician          Brie Gerber DO  04/30/21 3213

## 2021-04-29 NOTE — ED TRIAGE NOTES
Per EMS, the patient walked into a bank with a gun [facility] called to verify the story and the police stated the patient walked into the police station with a gun and stated he wanted to end his life. The patient refuses to verify his information. The patients information was vified through his personal id/ drivers license.

## 2021-04-29 NOTE — ED NOTES
Entered in [address] [place] [address]. Desire Pughm  04/29/21 24897 N \[facility] [place] [facility]\""  04/29/21 1244

## 2021-04-29 NOTE — ED NOTES
The patients family is at the bedside. Will continue to monitor.       Silvestre Shanks RN  04/29/21 5840

## 2021-04-29 NOTE — FLOWSHEET NOTE
Pt arrived on [address], he is agitated he let me get his vitals and then asked me to leave him alone. He asked for the door to be closed it was explained to him that due to his suicide precautions we cannot close the door and that a staff member would be sitting in the room with him    515pm pt resting in bed with eyes closed, I reached out to dr Sadia Corrales to request ciwa and clarify if pt was going to be pink slipped.  Awaiting response at this time

## 2021-04-30 ENCOUNTER — APPOINTMENT (OUTPATIENT)
Dept: MRI IMAGING | Age: 56
DRG: 885 | End: 2021-04-30
Payer: COMMERCIAL

## 2021-04-30 PROBLEM — R44.3 HALLUCINATION: Status: ACTIVE | Noted: 2021-04-30

## 2021-04-30 LAB
ALBUMIN SERPL-MCNC: 4.2 G/DL (ref 3.5–4.6)
ALP BLD-CCNC: 54 U/L (ref 35–104)
ALT SERPL-CCNC: 35 U/L (ref 0–41)
ANION GAP SERPL CALCULATED.3IONS-SCNC: 12 MEQ/L (ref 9–15)
AST SERPL-CCNC: 23 U/L (ref 0–40)
BASOPHILS ABSOLUTE: 0 K/UL (ref 0–0.2)
BASOPHILS RELATIVE PERCENT: 0.9 %
BILIRUB SERPL-MCNC: 0.7 MG/DL (ref 0.2–0.7)
BUN BLDV-MCNC: 7 MG/DL (ref 6–20)
CALCIUM SERPL-MCNC: 8.8 MG/DL (ref 8.5–9.9)
CHLORIDE BLD-SCNC: 103 MEQ/L (ref 95–107)
CO2: 25 MEQ/L (ref 20–31)
CREAT SERPL-MCNC: 0.62 MG/DL (ref 0.7–1.2)
EKG ATRIAL RATE: 74 BPM
EKG P AXIS: -1 DEGREES
EKG P-R INTERVAL: 162 MS
EKG Q-T INTERVAL: 412 MS
EKG QRS DURATION: 110 MS
EKG QTC CALCULATION (BAZETT): 457 MS
EKG R AXIS: -23 DEGREES
EKG T AXIS: -18 DEGREES
EKG VENTRICULAR RATE: 74 BPM
EOSINOPHILS ABSOLUTE: 0.1 K/UL (ref 0–0.7)
EOSINOPHILS RELATIVE PERCENT: 1.6 %
GFR AFRICAN AMERICAN: >60
GFR AFRICAN AMERICAN: >60
GFR NON-AFRICAN AMERICAN: >60
GFR NON-AFRICAN AMERICAN: >60
GLOBULIN: 3 G/DL (ref 2.3–3.5)
GLUCOSE BLD-MCNC: 95 MG/DL (ref 70–99)
HCT VFR BLD CALC: 41.4 % (ref 42–52)
HEMOGLOBIN: 14.2 G/DL (ref 14–18)
LYMPHOCYTES ABSOLUTE: 1.2 K/UL (ref 1–4.8)
LYMPHOCYTES RELATIVE PERCENT: 22.3 %
MCH RBC QN AUTO: 29.5 PG (ref 27–31.3)
MCHC RBC AUTO-ENTMCNC: 34.2 % (ref 33–37)
MCV RBC AUTO: 86.2 FL (ref 80–100)
MONOCYTES ABSOLUTE: 0.5 K/UL (ref 0.2–0.8)
MONOCYTES RELATIVE PERCENT: 9.1 %
NEUTROPHILS ABSOLUTE: 3.6 K/UL (ref 1.4–6.5)
NEUTROPHILS RELATIVE PERCENT: 66.1 %
PDW BLD-RTO: 14.2 % (ref 11.5–14.5)
PERFORMED ON: ABNORMAL
PLATELET # BLD: 235 K/UL (ref 130–400)
POC CREATININE: 0.7 MG/DL (ref 0.9–1.3)
POC SAMPLE TYPE: ABNORMAL
POTASSIUM REFLEX MAGNESIUM: 4 MEQ/L (ref 3.4–4.9)
RBC # BLD: 4.8 M/UL (ref 4.7–6.1)
REASON FOR REJECTION: NORMAL
REJECTED TEST: NORMAL
SODIUM BLD-SCNC: 140 MEQ/L (ref 135–144)
TOTAL PROTEIN: 7.2 G/DL (ref 6.3–8)
WBC # BLD: 5.4 K/UL (ref 4.8–10.8)

## 2021-04-30 PROCEDURE — 1210000000 HC MED SURG R&B

## 2021-04-30 PROCEDURE — 6370000000 HC RX 637 (ALT 250 FOR IP): Performed by: NURSE PRACTITIONER

## 2021-04-30 PROCEDURE — 95816 EEG AWAKE AND DROWSY: CPT

## 2021-04-30 PROCEDURE — 6360000004 HC RX CONTRAST MEDICATION: Performed by: INTERNAL MEDICINE

## 2021-04-30 PROCEDURE — 2580000003 HC RX 258: Performed by: NURSE PRACTITIONER

## 2021-04-30 PROCEDURE — 80053 COMPREHEN METABOLIC PANEL: CPT

## 2021-04-30 PROCEDURE — 70553 MRI BRAIN STEM W/O & W/DYE: CPT

## 2021-04-30 PROCEDURE — 90792 PSYCH DIAG EVAL W/MED SRVCS: CPT | Performed by: PSYCHIATRY & NEUROLOGY

## 2021-04-30 PROCEDURE — A9579 GAD-BASE MR CONTRAST NOS,1ML: HCPCS | Performed by: INTERNAL MEDICINE

## 2021-04-30 PROCEDURE — 36415 COLL VENOUS BLD VENIPUNCTURE: CPT

## 2021-04-30 PROCEDURE — 85025 COMPLETE CBC W/AUTO DIFF WBC: CPT

## 2021-04-30 PROCEDURE — 99223 1ST HOSP IP/OBS HIGH 75: CPT | Performed by: INTERNAL MEDICINE

## 2021-04-30 PROCEDURE — 99254 IP/OBS CNSLTJ NEW/EST MOD 60: CPT | Performed by: PSYCHIATRY & NEUROLOGY

## 2021-04-30 PROCEDURE — 96372 THER/PROPH/DIAG INJ SC/IM: CPT

## 2021-04-30 PROCEDURE — 6360000002 HC RX W HCPCS: Performed by: NURSE PRACTITIONER

## 2021-04-30 RX ORDER — MAGNESIUM HYDROXIDE/ALUMINUM HYDROXICE/SIMETHICONE 120; 1200; 1200 MG/30ML; MG/30ML; MG/30ML
30 SUSPENSION ORAL PRN
Status: CANCELLED | OUTPATIENT
Start: 2021-04-30

## 2021-04-30 RX ORDER — ACETAMINOPHEN 325 MG/1
650 TABLET ORAL EVERY 6 HOURS PRN
Status: CANCELLED | OUTPATIENT
Start: 2021-04-30

## 2021-04-30 RX ORDER — HALOPERIDOL 5 MG/ML
5 INJECTION INTRAMUSCULAR EVERY 4 HOURS PRN
Status: CANCELLED | OUTPATIENT
Start: 2021-04-30

## 2021-04-30 RX ORDER — BENZTROPINE MESYLATE 1 MG/ML
2 INJECTION INTRAMUSCULAR; INTRAVENOUS 2 TIMES DAILY PRN
Status: CANCELLED | OUTPATIENT
Start: 2021-04-30

## 2021-04-30 RX ORDER — ACETAMINOPHEN 325 MG/1
650 TABLET ORAL EVERY 4 HOURS PRN
Status: CANCELLED | OUTPATIENT
Start: 2021-04-30

## 2021-04-30 RX ORDER — ACETAMINOPHEN 650 MG/1
650 SUPPOSITORY RECTAL EVERY 6 HOURS PRN
Status: CANCELLED | OUTPATIENT
Start: 2021-04-30

## 2021-04-30 RX ORDER — TRAZODONE HYDROCHLORIDE 50 MG/1
50 TABLET ORAL NIGHTLY PRN
Status: CANCELLED | OUTPATIENT
Start: 2021-04-30

## 2021-04-30 RX ORDER — CARBOXYMETHYLCELLULOSE SODIUM 5 MG/ML
1 SOLUTION/ DROPS OPHTHALMIC 3 TIMES DAILY PRN
Status: DISCONTINUED | OUTPATIENT
Start: 2021-04-30 | End: 2021-05-01 | Stop reason: HOSPADM

## 2021-04-30 RX ORDER — HYDROXYZINE HYDROCHLORIDE 25 MG/1
50 TABLET, FILM COATED ORAL 3 TIMES DAILY PRN
Status: CANCELLED | OUTPATIENT
Start: 2021-04-30

## 2021-04-30 RX ORDER — HALOPERIDOL 2 MG/1
5 TABLET ORAL EVERY 4 HOURS PRN
Status: CANCELLED | OUTPATIENT
Start: 2021-04-30

## 2021-04-30 RX ORDER — FOLIC ACID 1 MG/1
1 TABLET ORAL DAILY
Status: CANCELLED | OUTPATIENT
Start: 2021-05-01

## 2021-04-30 RX ADMIN — GADOTERIDOL 20 ML: 279.3 INJECTION, SOLUTION INTRAVENOUS at 09:39

## 2021-04-30 RX ADMIN — SODIUM CHLORIDE, PRESERVATIVE FREE 10 ML: 5 INJECTION INTRAVENOUS at 22:01

## 2021-04-30 RX ADMIN — MULTIPLE VITAMINS W/ MINERALS TAB 1 TABLET: TAB at 10:11

## 2021-04-30 RX ADMIN — ENOXAPARIN SODIUM 40 MG: 40 INJECTION SUBCUTANEOUS at 10:11

## 2021-04-30 RX ADMIN — SODIUM CHLORIDE, PRESERVATIVE FREE 10 ML: 5 INJECTION INTRAVENOUS at 10:12

## 2021-04-30 RX ADMIN — FOLIC ACID 1 MG: 1 TABLET ORAL at 10:11

## 2021-04-30 RX ADMIN — Medication 100 MG: at 10:11

## 2021-04-30 RX ADMIN — CARBOXYMETHYLCELLULOSE SODIUM 1 DROP: 0.5 SOLUTION/ DROPS OPHTHALMIC at 21:59

## 2021-04-30 ASSESSMENT — ENCOUNTER SYMPTOMS
BACK PAIN: 0
NAUSEA: 0
TROUBLE SWALLOWING: 0
COLOR CHANGE: 0
PHOTOPHOBIA: 0
VOMITING: 0
CHOKING: 0
SHORTNESS OF BREATH: 0

## 2021-04-30 NOTE — CARE COORDINATION
[facility] Case Management Initial Discharge Assessment    Met with Patient to discuss discharge plan. PCP: Kay Last MD                                Date of Last Visit: has never seen dr Marnie Martino per patient    If no PCP, list provided? N/A    Discharge Planning    Living Arrangements: independently at home    Who do you live with? SPOUSE    Who helps you with your care:  self    If lives at home:     Do you have any barriers navigating in your home? no    Patient can perform ADL? Yes    Current Services (outpatient and in home) :  None    Dialysis: No    Is transportation available to get to your appointments? Yes    DME Equipment:  no    Respiratory equipment: None    Respiratory provider:  no     Pharmacy:  yes - [place] with Medication Assistance Program?  No      Patient agreeable to [facility]? Declined    Patient agreeable to SNF/Rehab? Declined    Other discharge needs identified? Other ADMIT TO     Does Patient Have a High-Risk for Readmission Diagnosis (CHF, PN, MI, COPD)? No      Initial Discharge Plan? (Note: please see concurrent daily documentation for any updates after initial note).     ADMIT TO [facility]    Readmission Risk              Risk of Unplanned Readmission:        0         Electronically signed by Win Martinez RN on 4/30/2021 at 1:10 PM

## 2021-04-30 NOTE — CONSULTS
Pulmonary and Critical Care Medicine  Consult Note  Encounter Date: 2021 2:31 PM    Mr. Salma Rick is a 50 y.o. male  : 1965  Requesting Provider: Eric Ly DO    Reason for request: Lung nodule            HISTORY OF PRESENT ILLNESS:    Patient is 50 y.o. presents with altered mental status, he showed up to police station with a gun in his hand and asking to end his life. Patient currently awake and alert, he does not recall what happened to him, I was asked to see patient for lung nodule found on CT chest in the right lower lobe. Patient report lung surgery 3 years ago involving the right side, otherwise he denies chest pain, no coughing, no shortness of breath, no weight loss, he smoked cigars occasionally in the past, he works at [employer], no family history of lung disease. Past Medical History:    History reviewed. No pertinent past medical history. Past Surgical History:        Procedure Laterality Date    HERNIA REPAIR      inguinal    TUMOR EXCISION      Right lung Lining       Social History:     reports that he has never smoked. He has never used smokeless tobacco. He reports current alcohol use of about 5.0 standard drinks of alcohol per week. He reports that he does not use drugs. Family History:   No family history on file. Allergies:  Patient has no known allergies.         MEDICATIONS during current hospitalization:    Continuous Infusions:   sodium chloride      sodium chloride         Scheduled Meds:   sodium chloride flush  5-40 mL Intravenous 2 times per day    enoxaparin  40 mg Subcutaneous Daily    sodium chloride flush  5-40 mL Intravenous 2 times per day    multivitamin  1 tablet Oral Daily    thiamine  100 mg Oral Daily    folic acid  1 mg Oral Daily       PRN Meds:sodium chloride flush, sodium chloride, promethazine **OR** ondansetron, polyethylene glycol, acetaminophen **OR** acetaminophen, haloperidol lactate, albuterol, sodium chloride flush, sodium chloride, LORazepam **OR** LORazepam **OR** LORazepam **OR** LORazepam **OR** LORazepam **OR** LORazepam **OR** LORazepam **OR** LORazepam        REVIEW OF SYSTEMS:  ROS: 10 organs review of system is done including general, psychological, ENT, hematological, endocrine, respiratory, cardiovascular, gastrointestinal, musculoskeletal, neurological,  allergy and Immunology is done and is otherwise negative. PHYSICAL EXAM:    Vitals:  BP (!) 141/91   Pulse 61   Temp 97.7 °F (36.5 °C) (Oral)   Resp 18   Ht 6' 1\" (1.854 m)   Wt 248 lb 10.9 oz (112.8 kg)   SpO2 100%   BMI 32.81 kg/m²     General: alert, cooperative, no distress  Head: normocephalic, atraumatic  Eyes:No gross abnormalities. ENT:  MMM no lesions  Neck:  supple and no masses  Chest : clear to auscultation bilaterally- no wheezes, rales or rhonchi, normal air movement, no respiratory distress  Heart[de-identified] Heart sounds are normal.  Regular rate and rhythm without murmur, gallop or rub. ABD:  symmetric, soft, non-tender, no guarding or rebound  Musculoskeletal : no cyanosis, no clubbing and no edema  Neuro:  Grossly normal  Skin: No rashes or nodules noted. Lymph node:  no cervical nodes  Urology: No [place]   Psychiatric: appropriate        Data Review  Recent Labs     04/29/21  1200 04/29/21  1256 04/30/21  0706   WBC 5.7  --  5.4   HGB 15.0 14.5 14.2   HCT 43.8  --  41.4*     --  235      Recent Labs     04/29/21  1256 04/29/21  1441 04/30/21  0505   NA  --  139 140   K  --  4.0 4.0   CL  --  105 103   CO2  --  22 25   BUN  --  7 7   CREATININE 0.6* 0.56* 0.62*   GLUCOSE  --  95 95       MV Settings:           ABGs:   Recent Labs     04/29/21  1256   PHART 7.441   [id number] 35   PO2ART 62*   BPA0KEZ 23.7   BEART 0   H5LGAJLH 93*   [id number] 25     O2 Device: None (Room air)  O2 Flow Rate (L/min): 0 L/min  Lab Results   Component Value Date    LACTA 2.2 04/29/2021       Radiology  I personally reviewed imaging studies and CT chest shows right lower lobe scarlike nodule        Assessment, plan:   Patient is at risk due to    · Right lower lobe nodule, likely residual scarring due to prior lung surgery on the right.   However due to history of smoking malignancy needs to be ruled out    Recommendations  · Outpatient PET scan if negative continue monitoring as per Fleischner criteria  · Follow-up with pulmonary after discharge       Thank you for consultation    Electronically signed by Aroldo Prajapati MD, [facility],  on 4/30/2021 at 2:31 PM

## 2021-04-30 NOTE — PLAN OF CARE
Problem: Skin Integrity:  Goal: Will show no infection signs and symptoms  Description: Will show no infection signs and symptoms  Outcome: Ongoing  Goal: Absence of new skin breakdown  Description: Absence of new skin breakdown  Outcome: Ongoing     Problem: Suicide risk  Goal: Provide patient with safe environment  Description: Provide patient with safe environment  Outcome: Ongoing     Problem: Falls - Risk of:  Goal: Will remain free from falls  Description: Will remain free from falls  Outcome: Ongoing  Goal: Absence of physical injury  Description: Absence of physical injury  Outcome: Ongoing

## 2021-04-30 NOTE — PROGRESS NOTES
Physician Progress Note    2021   11:08 AM    Name:  Will Grant  MRN:    [id number]      Day: 0     Admit Date: 2021 11:26 AM  PCP: Dov Milligan MD    Code Status:  Full Code      Assessment and Plan: Active Problems/ diagnosis:     Acute confusion-MRI is negative, laboratory analysis unrevealing, urine toxicology negative  Concern for suicidal ideation  Right-sided spiculated lung mass-seems to be much smaller than measurement provided an official radiology report. 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 Plan  2021  -Patient is hemodynamically stable and medically cleared for discharge pending consultant evaluation.  -Patient was discussed with neurologist by ED provider who recommended admission and MRI. MRI is negative as above.  -Awaiting psychiatry evaluation  -Pulmonary is also consulted for lung mass        DVT PPx     Subjective:     Feels well. No dyspnea. No hemoptysis. No unintentional weight loss, no night sweats. No chest pain.     Physical Examination:      Vitals:  BP (!) 141/91   Pulse 61   Temp 97.7 °F (36.5 °C) (Oral)   Resp 18   Ht 6' 1\" (1.854 m)   Wt 248 lb 10.9 oz (112.8 kg)   SpO2 100%   BMI 32.81 kg/m²   Temp (24hrs), Av °F (37.2 °C), Min:97.7 °F (36.5 °C), Max:100.2 °F (37.9 °C)      General appearance: alert, cooperative and no distress  Mental Status: oriented to person, place and time and normal affect  Lungs: clear to auscultation bilaterally, normal effort  Heart: regular rate and rhythm, no murmur  Abdomen: soft, nontender, nondistended, bowel sounds present, no masses  Extremities: no edema, redness, tenderness in the calves  Skin: no gross lesions, rashes    Data:     Labs:  Recent Labs     21  1200 21  1256 21  0706   WBC 5.7  --  5.4   HGB 15.0 14.5 14.2     --  235     Recent Labs     21  1441 21  0505    140   K 4.0 4.0    103   CO2 22 25   BUN 7 7   CREATININE 0.56* 0.62*   GLUCOSE 95 95     Recent Labs     04/29/21  1441 04/30/21  0505   AST 25 23   ALT 35 35   BILITOT 0.5 0.7   ALKPHOS 51 54       Current Facility-Administered Medications   Medication Dose Route Frequency Provider Last Rate Last Admin    sodium chloride flush 0.9 % injection 5-40 mL  5-40 mL Intravenous 2 times per day Mckenna Zuñiga APRN - CNP        sodium chloride flush 0.9 % injection 5-40 mL  5-40 mL Intravenous PRN Mckenna Zuñiga APRN - CNP        0.9 % sodium chloride infusion  25 mL Intravenous PRN Mckenna Zuñiga APRN - CNP        enoxaparin (LOVENOX) injection 40 mg  40 mg Subcutaneous Daily Mckenna Zuñiga APRN - CNP   40 mg at 04/30/21 1011    promethazine (PHENERGAN) tablet 12.5 mg  12.5 mg Oral Q6H PRN TE Phillips - CNP        Or    ondansetron (ZOFRAN) injection 4 mg  4 mg Intravenous Q6H PRN Mckenna Zuñiga APRN - CNP        polyethylene glycol (GLYCOLAX) packet 17 g  17 g Oral Daily PRN Mckenna Zuñiga APRN - CNP        acetaminophen (TYLENOL) tablet 650 mg  650 mg Oral Q6H PRN TE Phillips - CNP        Or    acetaminophen (TYLENOL) suppository 650 mg  650 mg Rectal Q6H PRN Mckenna Zuñiga APRN - CNP        haloperidol lactate (HALDOL) injection 0.5 mg  0.5 mg Intramuscular Q30 Min PRN Mckenna Zuñiga APRN - CNP        albuterol (PROVENTIL) nebulizer solution 2.5 mg  2.5 mg Nebulization Q2H PRN Mckenna Zuñiga APRN - CNP        sodium chloride flush 0.9 % injection 5-40 mL  5-40 mL Intravenous 2 times per day Vergie Jungavino, APRN - NP   10 mL at 04/30/21 1012    sodium chloride flush 0.9 % injection 5-40 mL  5-40 mL Intravenous PRN Vergie Jungavino, APRN - NP        0.9 % sodium chloride infusion  25 mL Intravenous PRN Vergie Jungavino, APRN - NP        therapeutic multivitamin-minerals 1 tablet  1 tablet Oral Daily Vergie Juniper, APRN - NP   1 tablet at 04/30/21 1011    thiamine tablet 100 mg  100 mg Oral Daily TE Hall - NP   100 mg at 04/30/21 1011    LORazepam (ATIVAN) tablet 1 mg  1 mg Oral Q1H PRN Clau Nori, APRN - NP        Or    LORazepam (ATIVAN) injection 1 mg  1 mg Intravenous Q1H PRN Clau Nori, APRN - NP        Or    LORazepam (ATIVAN) tablet 2 mg  2 mg Oral Q1H PRN Clau Nori, APRN - NP        Or    LORazepam (ATIVAN) injection 2 mg  2 mg Intravenous Q1H PRN Clau Nori, APRN - NP   2 mg at 04/29/21 2042    Or    LORazepam (ATIVAN) tablet 3 mg  3 mg Oral Q1H PRN Clau Nori, APRN - NP        Or    LORazepam (ATIVAN) injection 3 mg  3 mg Intravenous Q1H PRN Clau Nori, APRN - NP        Or    LORazepam (ATIVAN) tablet 4 mg  4 mg Oral Q1H PRN Clau Nori, APRN - NP        Or    LORazepam (ATIVAN) injection 4 mg  4 mg Intravenous Q1H PRN Clau Nori, APRN - NP        folic acid (FOLVITE) tablet 1 mg  1 mg Oral Daily Clau Nori, APRN - NP   1 mg at 04/30/21 1011       Additional work up or/and treatment plan may be added today or then after based on clinical progression. I am managing a portion of pt care. Some medical issues are handled by other specialists. Additional work up and treatment should be done in out pt setting by pt PCP and other out pt providers. In addition to examining and evaluating pt, I spent additional time explaining care, normaland abnormal findings, and treatment plan. All of pt questions were answered. Counseling, diet and education were provided. Case will be discussed with nursing staff when appropriate. Family will be updated if and when appropriate.        Electronically signed by Angela Batres DO on 4/30/2021 at 11:08 AM

## 2021-04-30 NOTE — CONSULTS
[facility], Department of Psychiatry  Behavioral Health Consult    REASON FOR CONSULT: Depression, SI    CONSULTING PHYSICIAN:     History obtained from: patient    HISTORY OF PRESENT ILLNESS:     The patient is a 50 y.o. male with no significant past psychiatric history    Per EMS, the patient walked into a bank with a gun and was taken to the [employer] station. LCP called to verify the story and the police stated the patient walked into the police station with a gun and stated he wanted to end his life    Pt report that he does not remember threatening to shoot self with the gun and does not remember carrying gun  Pt has been drinking 3-4 beer daily and last drink was 2 days ago  Pt is alert and oriented x 3  Pt report feeling overwhelmed and stressed out with his work. His wife work for an  and he want to remember going to [employer]. Since they were not helping him he went to the local police station  Pt report he felt paranoid about people plotting behind his back and want to report to the   Appeared perplexed to how and why all this is happening  Denies ever having any w/d seizures in the past  Pt was investigated for benign mass in his lung in the past.        The patient is not currently receiving care for the above psychiatric illness. Psychiatric Review of Systems       Depression: yes     Suzie or Hypomania:  no     Panic Attacks:  no     Phobias:  no     Obsessions and Compulsions:  no     PTSD : no     Hallucinations:  no     Delusions:  no      Substance Abuse History:  ETOH: yes  Marijuana: no  Opiates: no  Other Drugs: no      Past Psychiatric History:  Denies any past illness    Past Medical History:    History reviewed. No pertinent past medical history.     Past Surgical History:        Procedure Laterality Date    HERNIA REPAIR  2011    inguinal    TUMOR EXCISION  2003    Right lung Lining       Medications Prior to Admission:   Medications Prior to Admission: Glucosamine 500 MG CAPS, Take 1 capsule by mouth daily  mometasone (ELOCON) 0.1 % cream, Apply topically twice daily. Multiple Vitamin (MULTIVITAMIN PO), Take 1 tablet by mouth daily     Allergies:  Patient has no known allergies. FAMILY/SOCIAL HISTORY:  No family history on file. Social History     Socioeconomic History    Marital status:      Spouse name: Not on file    Number of children: Not on file    Years of education: Not on file    Highest education level: Not on file   Occupational History    Not on file   Social Needs    Financial resource strain: Not on file    Food insecurity     Worry: Not on file     Inability: Not on file    Transportation needs     Medical: Not on file     Non-medical: Not on file   Tobacco Use    Smoking status: Never Smoker    Smokeless tobacco: Never Used   Substance and Sexual Activity    Alcohol use:  Yes     Alcohol/week: 5.0 standard drinks     Types: 5 Cans of beer per week    Drug use: No    Sexual activity: Not on file   Lifestyle    Physical activity     Days per week: Not on file     Minutes per session: Not on file    Stress: Not on file   Relationships    Social connections     Talks on phone: Not on file     Gets together: Not on file     Attends [employer]ism service: Not on file     Active member of club or organization: Not on file     Attends meetings of clubs or organizations: Not on file     Relationship status: Not on file    Intimate partner violence     Fear of current or ex partner: Not on file     Emotionally abused: Not on file     Physically abused: Not on file     Forced sexual activity: Not on file   Other Topics Concern    Not on file   Social History Narrative    Not on file       REVIEW OF SYSTEMS    Constitutional: [] fever  [] chills  [] weight loss  []weakness [] Other:  Eyes:  [] photophobia  [] discharge [] acuity change   [] Diplopia   [] Other:  HENT:  [] sore throat  [] ear pain [] Tinnitus   [] Other  Respiratory: [] Cough  [] Shortness of breath   [] Sputum   [] Other:   Cardiac: []Chest pain   []Palpitations []Edema  []PND  [] Other:  GI:  []Abdominal pain   []Nausea  []Vomiting  []Diarrhea  [] Other:  :  [] Dysuria   []Frequency  []Hematuria  []Discharge  [] Other:  Possible Pregnancy: []Yes   []No   LMP:   Musculoskeletal:  []Back pain  []Neck pain  []Recent Injury   Skin:  []Rash  [] Itching  [] Other:  Neurologic:  [] Headache  [] Focal weakness  [] Sensory changes []Other:  Endocrine:  [] Polyuria  [] Polydipsia  [] Hair Loss  [] Other:  Lymphatic:   [] Swollen glands   Psychiatric:  As per HPI      All other systems negative except as marked or mentioned/indicated in the HPI. Mae George      PHYSICAL EXAM:  Vitals:  BP (!) 141/91   Pulse 61   Temp 97.7 °F (36.5 °C) (Oral)   Resp 18   Ht 6' 1\" (1.854 m)   Wt 248 lb 10.9 oz (112.8 kg)   SpO2 100%   BMI 32.81 kg/m²      Neuro Exam:   Muscle Strength & Tone: full ROM  Gait: normal gait   Involuntary Movements: No    Mental Status Examination:    Level of consciousness:  within normal limits   Appearance:  ill-appearing  Behavior/Motor:  psychomotor retardation  Attitude toward examiner:  cooperative  Speech:  slow   Mood: dysthymic  Affect:  blunted  Thought processes:  slow   Thought content:  Suicidal Ideation:  minimizing  Cognition:  oriented to person, place, and time   Concentration poor  Memory intact  Mini Mental Status not completed because   Insight poor   Judgement poor   Fund of Knowledge limited      DIAGNOSIS:     MDD single episode severe vs SIMD   Alcohol use disorder    RISK ASSESSMENT: high risk of suicide and impulsivity        LABS: REVIEWED TODAY:  Recent Labs     04/29/21  1200 04/29/21  1256 04/30/21  0706   WBC 5.7  --  5.4   HGB 15.0 14.5 14.2     --  235     Recent Labs     04/29/21  1256 04/29/21  1441 04/30/21  0505   NA  --  139 140   K  --  4.0 4.0   CL  --  105 103   CO2  --  22 25   BUN  --  7 7   CREATININE 0.6* 0.56* 0.62* suboptimal opacification. Within the limits of examination there are no gross central or gross proximal port emboli. There is a pleural-based spiculated mass at the lateral aspect of the right lower lobe series 3 image 33. It measures 9.5 x 1.8 cm in the transverse and AP dimension. There is a faint area of groundglass infiltrate within the base of the right upper lobe. . There are small areas of dependent atelectasis noted. No other focal parenchymal abnormalities no pleural effusions. No pneumothoraces. No significant periaortic, pretracheal, parahilar or subcarinal adenopathy. Within the field-of-view of the abdomen there is a small hiatal hernia. There is multilevel degenerative changes bridging ossified of the thoracic spine superpose upon a mild dorsal kyphosis. 1.  IMPRESSION: 2.  NO EVIDENCE OF CENTRAL OR SEGMENTAL PULMONARY EMBOLISM. 3.  THERE IS A PLEURAL-BASED MASS WITH SPICULATED APPEARANCE IN THE LATERAL ASPECT OF THE RIGHT LOWER LOBE. WILL NEED TO BE FOLLOW-UP TO FURTHER EVALUATE AS PER CRITERIA BELOW. 4.  VERY FAINT AREA OF GROUNDGLASS INFILTRATE WITHIN THE BASE THE RIGHT UPPER LOBE. ____________________________________________________________ Fleischner Society 2017 Guidelines for Management of Incidentally Detected Pulmonary Nodules in Adults A: Solid Nodules*      Single                Low risk**                     <6 mm     No routine follow-up                               6-8 mm     CT at 6-12 months, then consider CT at 18-24 months                 >8 mm     Consider CT at 3 months, PET/CT, or tissue sampling Nodules <6 mm do not require routine follow-up, but certain patients at high risk with suspicious nodule morphology, upper lobe location, or both may warrant 12-month follow-up (recommendation 1A).             High risk**                <6 mm     Optional CT at 12 months               6-8 mm     CT at 6-12 months, then CT at 18-24 months                 >8 mm     Consider CT at 3 months, PET/CT, or tissue sampling Nodules <6 mm do not require routine follow-up, but certain patients at high risk with suspicious nodule morphology, upper lobe location, or both may warrant 12-month follow-up (recommendation 1A). Multiple                         Low risk**                <6 mm     No routine follow-up               6-8 mm     CT at 3-6 months, then consider CT at 18-24 months                >8 mm     CT at 3-6 months, then  consider CT at 18-24 months Use most suspicious nodule as guide to management. Follow-up intervals may vary according to size and risk (recommendation 2A). High risk**                <6 mm     Optional CT at 12 months               6-8 mm     CT at 3-6 months, then at 18-24 months                >8 mm     CT at 3-6 months, then at 18-24 months Use most suspicious nodule as guide to management. Follow-up intervals may vary according to size and risk (recommendation 2A). B: Subsolid Nodules*      Single                  Ground glass                <6 mm     No routine follow-up              >=6 mm     CT at 6-12 months to confirm persistence, then CT  every 2 years until 5 years In certain suspicious nodules <6 mm, consider follow-up at 2 and 4 years. If solid component(s) or growth develops, consider resection. (Recommendations 3A and 4A). Part solid                <6 mm     No routine follow-up              >=6 mm     CT at 3-6 months to confirm persistence. If unchanged and solid component remains <6 mm, annual CT  should be performed for 5 years. In practice, part-solid nodules cannot be defined as such until >=6 mm, and nodules <6 mm do not usually require follow-up. Persistent part-solid nodules with solid components >=6 mm should be considered highly suspicious (recommendations 4A-4C)      Multiple                <6 mm     CT at 3-6 months. If stable, consider CT at 2 and 4 years. >=6 mm     CT at 3-6 months.  Subsequent management based  on the most suspicious nodule(s). Multiple <6 mm pure ground-glass nodules are usually benign, but consider follow-up in selected patients at high risk at 2 and 4 years (recommendation 5A). Note. --These recommendations do not apply to lung cancer screening, patients with immunosuppression, or patients with known primary cancer. * Dimensions are average of long and short axes, rounded to the nearest millimeter. ** Consider all relevant risk factors (see Risk Factors). ____________________________________________________________ All CT scans at this facility use dose modulation, iterative reconstruction, and/or weight based dosing when appropriate to reduce radiation dose to as low as reasonably achievable. Mri Brain W Wo Contrast    Result Date: 4/30/2021  MRI BRAIN W WO CONTRAST : 4/30/2021 CLINICAL HISTORY:  Acute encephalopathy, change in mental status confusion . COMPARISON: Head CT 4/29/2021. TECHNIQUE: Multiplanar MR imaging of the head was performed before and after intravenous administration of approximately 20 mL of ProHance gadolinium contrast. FINDINGS: Motion artifact mild to moderately limits detail of some sequences. There is no infarct, intracranial hemorrhage, abnormal enhancement, mass effect, midline shift, extra-axial collection, or hydrocephalus. There is no significant atrophy or white matter changes, for age. NO ACUTE INTRACRANIAL PROCESS IDENTIFIED. EKG: TRACING REVIEWED    RECOMMENDATIONS    Risk Management:  1:1 sitter    Would recommend clearance from Neurology before admitting to psych  Continue 1 to 1 sitter    Discussed with the treating physician/ team about the patient and treatment plan  Reviewed the chart    Discussed with the patient risk, benefit, alternative and common side effects for the  proposed medication treatment. Patient is consenting to the treatment. Thanks for the consult. Please call me if needed.     Electronically signed by Rahat Moiz

## 2021-04-30 NOTE — DISCHARGE SUMMARY
fracture identified. There is no significant atrophy or white matter changes, for age. The mastoid air cells and visualized paranasal sinuses are essentially clear. NO ACUTE INTRACRANIAL PROCESS IDENTIFIED. Cta Chest W Wo Contrast    Result Date: 4/29/2021  The EXAMINATION: CT scan of the chest with contrast (pulmonary embolism protocol) INDICATION: Tachypnea, fever, change in mental status, hypoxia. COMPARISON: None TECHNIQUE: Helical CT was performed through the chest utilizing 100 cc of Isovue-300 intravenous contrast.  Images were obtained with bolus tracking in order to opacify the pulmonary arteries. Both MIP and 3D volume rendered reconstructions were performed. FINDINGS: This is a limited examination due to suboptimal opacification. Within the limits of examination there are no gross central or gross proximal port emboli. There is a pleural-based spiculated mass at the lateral aspect of the right lower lobe series 3 image 33. It measures 9.5 x 1.8 cm in the transverse and AP dimension. There is a faint area of groundglass infiltrate within the base of the right upper lobe. . There are small areas of dependent atelectasis noted. No other focal parenchymal abnormalities no pleural effusions. No pneumothoraces. No significant periaortic, pretracheal, parahilar or subcarinal adenopathy. Within the field-of-view of the abdomen there is a small hiatal hernia. There is multilevel degenerative changes bridging ossified of the thoracic spine superpose upon a mild dorsal kyphosis. 1.  IMPRESSION: 2.  NO EVIDENCE OF CENTRAL OR SEGMENTAL PULMONARY EMBOLISM. 3.  THERE IS A PLEURAL-BASED MASS WITH SPICULATED APPEARANCE IN THE LATERAL ASPECT OF THE RIGHT LOWER LOBE. WILL NEED TO BE FOLLOW-UP TO FURTHER EVALUATE AS PER CRITERIA BELOW.  4.  VERY FAINT AREA OF GROUNDGLASS INFILTRATE WITHIN THE BASE THE RIGHT UPPER LOBE. ____________________________________________________________ Fleischner Society 2017 Guidelines for Management of Incidentally Detected Pulmonary Nodules in Adults A: Solid Nodules*      Single                Low risk**                     <6 mm     No routine follow-up                               6-8 mm     CT at 6-12 months, then consider CT at 18-24 months                 >8 mm     Consider CT at 3 months, PET/CT, or tissue sampling Nodules <6 mm do not require routine follow-up, but certain patients at high risk with suspicious nodule morphology, upper lobe location, or both may warrant 12-month follow-up (recommendation 1A). High risk**                <6 mm     Optional CT at 12 months               6-8 mm     CT at 6-12 months, then CT at 18-24 months                 >8 mm     Consider CT at 3 months, PET/CT, or tissue sampling Nodules <6 mm do not require routine follow-up, but certain patients at high risk with suspicious nodule morphology, upper lobe location, or both may warrant 12-month follow-up (recommendation 1A). Multiple                         Low risk**                <6 mm     No routine follow-up               6-8 mm     CT at 3-6 months, then consider CT at 18-24 months                >8 mm     CT at 3-6 months, then  consider CT at 18-24 months Use most suspicious nodule as guide to management. Follow-up intervals may vary according to size and risk (recommendation 2A). High risk**                <6 mm     Optional CT at 12 months               6-8 mm     CT at 3-6 months, then at 18-24 months                >8 mm     CT at 3-6 months, then at 18-24 months Use most suspicious nodule as guide to management. Follow-up intervals may vary according to size and risk (recommendation 2A).  B: Subsolid Nodules*      Single                  Ground glass                <6 mm     No routine follow-up              >=6 mm     CT at 6-12 months to confirm persistence, then CT  every 2 years until 5 years In certain suspicious nodules <6 mm, consider follow-up at 2 and 4 years. If solid component(s) or growth develops, consider resection. (Recommendations 3A and 4A). Part solid                <6 mm     No routine follow-up              >=6 mm     CT at 3-6 months to confirm persistence. If unchanged and solid component remains <6 mm, annual CT  should be performed for 5 years. In practice, part-solid nodules cannot be defined as such until >=6 mm, and nodules <6 mm do not usually require follow-up. Persistent part-solid nodules with solid components >=6 mm should be considered highly suspicious (recommendations 4A-4C)      Multiple                <6 mm     CT at 3-6 months. If stable, consider CT at 2 and 4 years. >=6 mm     CT at 3-6 months. Subsequent management based  on the most suspicious nodule(s). Multiple <6 mm pure ground-glass nodules are usually benign, but consider follow-up in selected patients at high risk at 2 and 4 years (recommendation 5A). Note. --These recommendations do not apply to lung cancer screening, patients with immunosuppression, or patients with known primary cancer. * Dimensions are average of long and short axes, rounded to the nearest millimeter. ** Consider all relevant risk factors (see Risk Factors). ____________________________________________________________ All CT scans at this facility use dose modulation, iterative reconstruction, and/or weight based dosing when appropriate to reduce radiation dose to as low as reasonably achievable. Mri Brain W Wo Contrast    Result Date: 4/30/2021  MRI BRAIN W WO CONTRAST : 4/30/2021 CLINICAL HISTORY:  Acute encephalopathy, change in mental status confusion . COMPARISON: Head CT 4/29/2021. TECHNIQUE: Multiplanar MR imaging of the head was performed before and after intravenous administration of approximately 20 mL of ProHance gadolinium contrast. FINDINGS: Motion artifact mild to moderately limits detail of some sequences.  There is no infarct, intracranial hemorrhage, abnormal enhancement, mass effect, midline shift, extra-axial collection, or hydrocephalus. There is no significant atrophy or white matter changes, for age. NO ACUTE INTRACRANIAL PROCESS IDENTIFIED. Discharge Medications:       Jennifer Hopson   Home Medication Instructions ELB:[id number]    Printed on:05/01/21 1972   Medication Information                      Glucosamine 500 MG CAPS  Take 1 capsule by mouth daily             mometasone (ELOCON) 0.1 % cream  Apply topically twice daily. Multiple Vitamin (MULTIVITAMIN PO)  Take 1 tablet by mouth daily                  Disposition:   If discharged to Home, Any [place] [id number] needs that were indicated and/or required as been addressed and set up by Social Work. Condition at discharge: good     Activity: activity as tolerated    Total time taken for discharging this patient: 40 minutes. Greater than 70% of time was spent focused exclusively on this patient. Time was taken to review chart, discuss plans with consultants, reconciling medications, discussing plan answering questions with patient.      Perfecto Cones  5/1/2021, 9:36 AM  ----------------------------------------------------------------------------------------------------------------------    Almaz Case

## 2021-04-30 NOTE — PROGRESS NOTES
Returned from MRI via cart. Tolerated procedure well. 1:1 maintained while @ MRI. Voices no complaints upon return to the room. No acute distress noted.

## 2021-04-30 NOTE — PROGRESS NOTES
Patient is medically stable for discharge home or to psychiatry floor if recommended by psychiatrist.

## 2021-04-30 NOTE — CONSULTS
Subjective:      Patient ID: Joaquin Angel is a 50 y.o. male who presents today for confusion    HPI 53-year-old right-handed gentleman admitted with difficult underlying attention and he had poor recall of events. The nurses note it did appear that he was oriented to self place month and year. He reported that he was overwhelmed at work he claims he has fear of losing his job. He reports that he is not able to balance his life. Is paranoid that his coworkers are trying to get him fired. When I was called I was given a different story and we were giving the story from EMS and per EMS patient walked into the bank with a gun and was taken to police station. [facility] police were called and the police stated that patient walked into the police station with a gun and stated that he wanted to end his life. I was consulted stat for this evaluation in the emergency room and I did not think that there was anything neurologic given the background history what he is telling the nurse    After his admission we obtained an MRI of the brain which is normal and I actually obtain history from him and he reports that he went to see his wife at work. On questioning why he did that he said he was passing by and thought he would see her. He then tried to get into the bank which was closed and this is all he can remember. On close questioning he becomes very teary-eyed tells me that he is got too much on his plate and he is very stressed out. He though remains oriented he has no other physical symptoms. At one point he told me that his beer might have been contaminated by his friend    Review of Systems   Constitutional: Negative for fever. HENT: Negative for ear pain, tinnitus and trouble swallowing. Eyes: Negative for photophobia and visual disturbance. Respiratory: Negative for choking and shortness of breath. Cardiovascular: Negative for chest pain and palpitations.    Gastrointestinal: Negative for nausea abused: Not on file     Forced sexual activity: Not on file   Other Topics Concern    Not on file   Social History Narrative    Not on file     No family history on file.   No Known Allergies  Current Facility-Administered Medications   Medication Dose Route Frequency Provider Last Rate Last Admin    sodium chloride flush 0.9 % injection 5-40 mL  5-40 mL Intravenous 2 times per day TE Arguello - CNP        sodium chloride flush 0.9 % injection 5-40 mL  5-40 mL Intravenous PRN TE Arguello - CNP        0.9 % sodium chloride infusion  25 mL Intravenous PRN Wale Pitts APRN - YARED        enoxaparin (LOVENOX) injection 40 mg  40 mg Subcutaneous Daily TE Arguello - CNP   40 mg at 04/30/21 1011    promethazine (PHENERGAN) tablet 12.5 mg  12.5 mg Oral Q6H PRN TE Arguello CNP        Or    ondansetron (ZOFRAN) injection 4 mg  4 mg Intravenous Q6H PRN TE Arguello - YARED        polyethylene glycol (GLYCOLAX) packet 17 g  17 g Oral Daily PRN Wale Pitts APRN - YARED        acetaminophen (TYLENOL) tablet 650 mg  650 mg Oral Q6H PRN TE Arguello - CNP        Or    acetaminophen (TYLENOL) suppository 650 mg  650 mg Rectal Q6H PRN TE Arguello - YARED        haloperidol lactate (HALDOL) injection 0.5 mg  0.5 mg Intramuscular Q30 Min PRN TE Arguello - CNP        albuterol (PROVENTIL) nebulizer solution 2.5 mg  2.5 mg Nebulization Q2H PRN TE Arguello - CNP        sodium chloride flush 0.9 % injection 5-40 mL  5-40 mL Intravenous 2 times per day Lambert Enrique, APRN - NP   10 mL at 04/30/21 1012    sodium chloride flush 0.9 % injection 5-40 mL  5-40 mL Intravenous PRN Lambert Enrique, APRN - NP        0.9 % sodium chloride infusion  25 mL Intravenous PRN Lambert Enrique, APRN - NP        therapeutic multivitamin-minerals 1 tablet  1 tablet Oral Daily Lambert Enrique, APRN - NP   1 tablet at 04/30/21 1011    thiamine tablet 100 mg  100 mg Oral the right side. Babinski sign absent on the left side. Psychiatric:         Mood and Affect: Mood normal.         Ct Head Wo Contrast    Result Date: 4/29/2021  CT HEAD WO CONTRAST : 4/29/2021 CLINICAL HISTORY:  confused, low grade fever; . COMPARISON: None available. TECHNIQUE: Spiral unenhanced images were obtained of the head, with routine multiplanar reconstructions performed. All CT scans at this facility use dose modulation, iterative reconstruction, and/or weight based dosing when appropriate to reduce radiation dose to as low as reasonably achievable. FINDINGS: There is no intracranial hemorrhage, mass effect, midline shift, extra-axial collection, evidence of hydrocephalus, recent ischemic infarct, or skull fracture identified. There is no significant atrophy or white matter changes, for age. The mastoid air cells and visualized paranasal sinuses are essentially clear. NO ACUTE INTRACRANIAL PROCESS IDENTIFIED. Cta Chest W Wo Contrast    Result Date: 4/29/2021  The EXAMINATION: CT scan of the chest with contrast (pulmonary embolism protocol) INDICATION: Tachypnea, fever, change in mental status, hypoxia. COMPARISON: None TECHNIQUE: Helical CT was performed through the chest utilizing 100 cc of Isovue-300 intravenous contrast.  Images were obtained with bolus tracking in order to opacify the pulmonary arteries. Both MIP and 3D volume rendered reconstructions were performed. FINDINGS: This is a limited examination due to suboptimal opacification. Within the limits of examination there are no gross central or gross proximal port emboli. There is a pleural-based spiculated mass at the lateral aspect of the right lower lobe series 3 image 33. It measures 9.5 x 1.8 cm in the transverse and AP dimension. There is a faint area of groundglass infiltrate within the base of the right upper lobe. . There are small areas of dependent atelectasis noted.  No other focal parenchymal abnormalities no pleural effusions. No pneumothoraces. No significant periaortic, pretracheal, parahilar or subcarinal adenopathy. Within the field-of-view of the abdomen there is a small hiatal hernia. There is multilevel degenerative changes bridging ossified of the thoracic spine superpose upon a mild dorsal kyphosis. 1.  IMPRESSION: 2.  NO EVIDENCE OF CENTRAL OR SEGMENTAL PULMONARY EMBOLISM. 3.  THERE IS A PLEURAL-BASED MASS WITH SPICULATED APPEARANCE IN THE LATERAL ASPECT OF THE RIGHT LOWER LOBE. WILL NEED TO BE FOLLOW-UP TO FURTHER EVALUATE AS PER CRITERIA BELOW. 4.  VERY FAINT AREA OF GROUNDGLASS INFILTRATE WITHIN THE BASE THE RIGHT UPPER LOBE. ____________________________________________________________ Fleischner Society 2017 Guidelines for Management of Incidentally Detected Pulmonary Nodules in Adults A: Solid Nodules*      Single                Low risk**                     <6 mm     No routine follow-up                               6-8 mm     CT at 6-12 months, then consider CT at 18-24 months                 >8 mm     Consider CT at 3 months, PET/CT, or tissue sampling Nodules <6 mm do not require routine follow-up, but certain patients at high risk with suspicious nodule morphology, upper lobe location, or both may warrant 12-month follow-up (recommendation 1A). High risk**                <6 mm     Optional CT at 12 months               6-8 mm     CT at 6-12 months, then CT at 18-24 months                 >8 mm     Consider CT at 3 months, PET/CT, or tissue sampling Nodules <6 mm do not require routine follow-up, but certain patients at high risk with suspicious nodule morphology, upper lobe location, or both may warrant 12-month follow-up (recommendation 1A).       Multiple                         Low risk**                <6 mm     No routine follow-up               6-8 mm     CT at 3-6 months, then consider CT at 18-24 months                >8 mm     CT at 3-6 months, then  consider CT at 18-24 months Use 04/29/2021    LABBENZ Neg 04/29/2021    LABMETH Neg 04/29/2021    OPIATESCREENURINE Neg 04/29/2021    PHENCYCLIDINESCREENURINE Neg 04/29/2021    ETOH <10 04/29/2021     No results found for: LITHIUM, DILFRTOT, VALPROATE    Assessment:   Encephalopathy related to underlying multiple stressors and depression with anxiety. Neurologic examination is normal and as far as patient remains oriented this likely represents more a psychiatric disorder. On close questioning there is more than I know and can tell as there is different stories and he is quite overwhelmed. We will arrange for a EEG and if this is normal and an MRI is normal and all his labs are normal we do not really think that this is organic. Need psychiatric input on the same. This consultation includes my consultation with emergency room. Florencio Dangelo MD, [id number], American Board of Psychiatry & Neurology  Board Certified in Vascular Neurology  Board Certified in Neuromuscular Medicine  Certified in [facility]:

## 2021-05-01 ENCOUNTER — HOSPITAL ENCOUNTER (INPATIENT)
Age: 56
LOS: 6 days | Discharge: HOME | DRG: 885 | End: 2021-05-07
Attending: PSYCHIATRY & NEUROLOGY | Admitting: PSYCHIATRY & NEUROLOGY
Payer: COMMERCIAL

## 2021-05-01 VITALS
HEIGHT: 73 IN | TEMPERATURE: 97.7 F | HEART RATE: 65 BPM | BODY MASS INDEX: 32.96 KG/M2 | WEIGHT: 248.68 LBS | RESPIRATION RATE: 18 BRPM | SYSTOLIC BLOOD PRESSURE: 149 MMHG | DIASTOLIC BLOOD PRESSURE: 87 MMHG | OXYGEN SATURATION: 97 %

## 2021-05-01 PROBLEM — F19.94 SUBSTANCE INDUCED MOOD DISORDER (HCC): Status: ACTIVE | Noted: 2021-05-01

## 2021-05-01 LAB — SARS-COV-2, NAAT: NOT DETECTED

## 2021-05-01 PROCEDURE — 6370000000 HC RX 637 (ALT 250 FOR IP): Performed by: NURSE PRACTITIONER

## 2021-05-01 PROCEDURE — 6370000000 HC RX 637 (ALT 250 FOR IP): Performed by: PSYCHIATRY & NEUROLOGY

## 2021-05-01 PROCEDURE — 95819 EEG AWAKE AND ASLEEP: CPT | Performed by: PSYCHIATRY & NEUROLOGY

## 2021-05-01 PROCEDURE — 6370000000 HC RX 637 (ALT 250 FOR IP): Performed by: INTERNAL MEDICINE

## 2021-05-01 PROCEDURE — 1240000000 HC EMOTIONAL WELLNESS R&B

## 2021-05-01 PROCEDURE — APPSS30 APP SPLIT SHARED TIME 16-30 MINUTES: Performed by: STUDENT IN AN ORGANIZED HEALTH CARE EDUCATION/TRAINING PROGRAM

## 2021-05-01 PROCEDURE — 99233 SBSQ HOSP IP/OBS HIGH 50: CPT | Performed by: PSYCHIATRY & NEUROLOGY

## 2021-05-01 PROCEDURE — 87635 SARS-COV-2 COVID-19 AMP PRB: CPT

## 2021-05-01 RX ORDER — MAGNESIUM HYDROXIDE/ALUMINUM HYDROXICE/SIMETHICONE 120; 1200; 1200 MG/30ML; MG/30ML; MG/30ML
30 SUSPENSION ORAL PRN
Status: DISCONTINUED | OUTPATIENT
Start: 2021-05-01 | End: 2021-05-07 | Stop reason: HOSPADM

## 2021-05-01 RX ORDER — BENZTROPINE MESYLATE 1 MG/ML
2 INJECTION INTRAMUSCULAR; INTRAVENOUS 2 TIMES DAILY PRN
Status: DISCONTINUED | OUTPATIENT
Start: 2021-05-01 | End: 2021-05-07 | Stop reason: HOSPADM

## 2021-05-01 RX ORDER — LORAZEPAM 1 MG/1
1 TABLET ORAL
Status: DISCONTINUED | OUTPATIENT
Start: 2021-05-01 | End: 2021-05-04

## 2021-05-01 RX ORDER — LORAZEPAM 2 MG/ML
2 INJECTION INTRAMUSCULAR
Status: DISCONTINUED | OUTPATIENT
Start: 2021-05-01 | End: 2021-05-04

## 2021-05-01 RX ORDER — LORAZEPAM 2 MG/ML
4 INJECTION INTRAMUSCULAR
Status: DISCONTINUED | OUTPATIENT
Start: 2021-05-01 | End: 2021-05-04

## 2021-05-01 RX ORDER — LORAZEPAM 2 MG/ML
3 INJECTION INTRAMUSCULAR
Status: DISCONTINUED | OUTPATIENT
Start: 2021-05-01 | End: 2021-05-04

## 2021-05-01 RX ORDER — HYDROXYZINE HYDROCHLORIDE 25 MG/1
50 TABLET, FILM COATED ORAL 3 TIMES DAILY PRN
Status: DISCONTINUED | OUTPATIENT
Start: 2021-05-01 | End: 2021-05-07 | Stop reason: HOSPADM

## 2021-05-01 RX ORDER — OLANZAPINE 5 MG/1
5 TABLET ORAL NIGHTLY
Status: DISCONTINUED | OUTPATIENT
Start: 2021-05-01 | End: 2021-05-02

## 2021-05-01 RX ORDER — LORAZEPAM 1 MG/1
3 TABLET ORAL
Status: DISCONTINUED | OUTPATIENT
Start: 2021-05-01 | End: 2021-05-04

## 2021-05-01 RX ORDER — HALOPERIDOL 5 MG
5 TABLET ORAL EVERY 4 HOURS PRN
Status: DISCONTINUED | OUTPATIENT
Start: 2021-05-01 | End: 2021-05-07 | Stop reason: HOSPADM

## 2021-05-01 RX ORDER — CARBOXYMETHYLCELLULOSE SODIUM 5 MG/ML
1 SOLUTION/ DROPS OPHTHALMIC 3 TIMES DAILY PRN
Status: DISCONTINUED | OUTPATIENT
Start: 2021-05-01 | End: 2021-05-07 | Stop reason: HOSPADM

## 2021-05-01 RX ORDER — FOLIC ACID 1 MG/1
1 TABLET ORAL DAILY
Status: DISCONTINUED | OUTPATIENT
Start: 2021-05-02 | End: 2021-05-07 | Stop reason: HOSPADM

## 2021-05-01 RX ORDER — LORAZEPAM 1 MG/1
4 TABLET ORAL
Status: DISCONTINUED | OUTPATIENT
Start: 2021-05-01 | End: 2021-05-04

## 2021-05-01 RX ORDER — LANOLIN ALCOHOL/MO/W.PET/CERES
100 CREAM (GRAM) TOPICAL DAILY
Status: DISCONTINUED | OUTPATIENT
Start: 2021-05-01 | End: 2021-05-07 | Stop reason: HOSPADM

## 2021-05-01 RX ORDER — HALOPERIDOL 5 MG/ML
5 INJECTION INTRAMUSCULAR EVERY 4 HOURS PRN
Status: DISCONTINUED | OUTPATIENT
Start: 2021-05-01 | End: 2021-05-07 | Stop reason: HOSPADM

## 2021-05-01 RX ORDER — LORAZEPAM 1 MG/1
2 TABLET ORAL
Status: DISCONTINUED | OUTPATIENT
Start: 2021-05-01 | End: 2021-05-04

## 2021-05-01 RX ORDER — CARBOXYMETHYLCELLULOSE SODIUM 5 MG/ML
1 SOLUTION/ DROPS OPHTHALMIC 3 TIMES DAILY PRN
Status: CANCELLED | OUTPATIENT
Start: 2021-05-01

## 2021-05-01 RX ORDER — MECOBALAMIN 5000 MCG
5 TABLET,DISINTEGRATING ORAL NIGHTLY
Status: CANCELLED | OUTPATIENT
Start: 2021-05-01

## 2021-05-01 RX ORDER — MECOBALAMIN 5000 MCG
5 TABLET,DISINTEGRATING ORAL NIGHTLY
Status: DISCONTINUED | OUTPATIENT
Start: 2021-05-01 | End: 2021-05-01 | Stop reason: HOSPADM

## 2021-05-01 RX ORDER — LORAZEPAM 2 MG/ML
1 INJECTION INTRAMUSCULAR
Status: DISCONTINUED | OUTPATIENT
Start: 2021-05-01 | End: 2021-05-04

## 2021-05-01 RX ORDER — MULTIVITAMIN WITH IRON
1 TABLET ORAL DAILY
Status: DISCONTINUED | OUTPATIENT
Start: 2021-05-01 | End: 2021-05-07 | Stop reason: HOSPADM

## 2021-05-01 RX ORDER — MECOBALAMIN 5000 MCG
5 TABLET,DISINTEGRATING ORAL NIGHTLY
Status: DISCONTINUED | OUTPATIENT
Start: 2021-05-01 | End: 2021-05-07 | Stop reason: HOSPADM

## 2021-05-01 RX ORDER — ACETAMINOPHEN 325 MG/1
650 TABLET ORAL EVERY 6 HOURS PRN
Status: DISCONTINUED | OUTPATIENT
Start: 2021-05-01 | End: 2021-05-01 | Stop reason: SDUPTHER

## 2021-05-01 RX ORDER — SERTRALINE HYDROCHLORIDE 25 MG/1
25 TABLET, FILM COATED ORAL DAILY
Status: DISCONTINUED | OUTPATIENT
Start: 2021-05-01 | End: 2021-05-02

## 2021-05-01 RX ORDER — TRAZODONE HYDROCHLORIDE 50 MG/1
50 TABLET ORAL NIGHTLY PRN
Status: DISCONTINUED | OUTPATIENT
Start: 2021-05-01 | End: 2021-05-07 | Stop reason: HOSPADM

## 2021-05-01 RX ORDER — ACETAMINOPHEN 325 MG/1
650 TABLET ORAL EVERY 4 HOURS PRN
Status: DISCONTINUED | OUTPATIENT
Start: 2021-05-01 | End: 2021-05-07 | Stop reason: HOSPADM

## 2021-05-01 RX ORDER — ACETAMINOPHEN 650 MG/1
650 SUPPOSITORY RECTAL EVERY 6 HOURS PRN
Status: DISCONTINUED | OUTPATIENT
Start: 2021-05-01 | End: 2021-05-07 | Stop reason: HOSPADM

## 2021-05-01 RX ADMIN — MULTIPLE VITAMINS W/ MINERALS TAB 1 TABLET: TAB at 10:17

## 2021-05-01 RX ADMIN — FOLIC ACID 1 MG: 1 TABLET ORAL at 10:17

## 2021-05-01 RX ADMIN — OLANZAPINE 5 MG: 5 TABLET, FILM COATED ORAL at 21:24

## 2021-05-01 RX ADMIN — THERA TABS 1 TABLET: TAB at 12:33

## 2021-05-01 RX ADMIN — Medication 100 MG: at 12:33

## 2021-05-01 RX ADMIN — SERTRALINE HYDROCHLORIDE 25 MG: 25 TABLET ORAL at 12:33

## 2021-05-01 RX ADMIN — Medication 5 MG: at 00:24

## 2021-05-01 RX ADMIN — Medication 5 MG: at 21:24

## 2021-05-01 RX ADMIN — Medication 100 MG: at 10:17

## 2021-05-01 ASSESSMENT — SLEEP AND FATIGUE QUESTIONNAIRES
DIFFICULTY FALLING ASLEEP: YES
RESTFUL SLEEP: NO
DIFFICULTY ARISING: YES
SLEEP PATTERN: DIFFICULTY FALLING ASLEEP

## 2021-05-01 ASSESSMENT — ENCOUNTER SYMPTOMS
BACK PAIN: 0
VOMITING: 0
NAUSEA: 0
TROUBLE SWALLOWING: 0
COLOR CHANGE: 0
PHOTOPHOBIA: 0
SHORTNESS OF BREATH: 0
CHOKING: 0

## 2021-05-01 ASSESSMENT — PATIENT HEALTH QUESTIONNAIRE - PHQ9: SUM OF ALL RESPONSES TO PHQ QUESTIONS 1-9: 15

## 2021-05-01 NOTE — FLOWSHEET NOTE
Pt reports reason for admission is because he wants to get help. He has sleep deprivation and he called off and said he had a raspy voice. He is paranoid because of the wireless router, bluetooth, and apple stuff. Too many jobs and not taking care of himself. He went to a neighbors and talked about the ring camera and how their dog didn't bark. He went to his wife's work and she wouldn't answer so the person who did answer he gave her his card and said he was having trouble with it. Told her he needed help and to call 911. (Patient tearful with explaining). He believes his card got thrown out. He left the bank and the police were coming. He flagged them down and also stated, \"I learned one thing, you don't run away from the police. \"  He is stressed because he is trying to fix the world and wants to make an impact on people. Wears many different hats. [employer] hat, safety committee hat, family man, dad hat. He is depressed because he is not taking care of wife and boy like he should. Reports he is not getting the nourishment his body needs. Believes someone put something in a beer and a half that he drank and that it changed his state of mind. Pt at times has elevated facial expressions. FOI and tangential during interview with nurse and physician.

## 2021-05-01 NOTE — H&P
Department of Psychiatry  TELE PSYCHIATRY/ VIRTUAL ASSESSMENT  History and Physical - Adult   THE PATIENT WAS SEEN THROUGH TELEPSYCHIATRY, IN A REAL-TIME, AUDIO-VIDEO ENCOUNTER, WITH THE PATIENT IN [place] [id number], [address] [facility]  Medicare Certification Upon Admission    I certify that this patient's inpatient psychiatric hospital admission is medically necessary for:    [x] (1) Treatment which could reasonably be expected to improve this patient's condition,       [x] (2) Or for diagnostic study;     AND     [x](2) The inpatient psychiatric services are provided while the individual is under the care of a physician and are included in the individualized plan of care. Estimated length of stay/service 5-7 days    Plan for post-hospital care follow up with outpatient services    Electronically signed by Efra Delarosa MD on 5/1/2021 at 3:59 PM        CHIEF COMPLAINT:  Depressed mood, expressed suicidal threats, confusion, disorganized thought process    History obtained from:  patient, electronic medical record    Patient was seen after discussing with the treatment team and reviewing the chart    CIRCUMSTANCES OF ADMISSION:   Mr. Eladia Lopez is a 50 y.o. male with unclear history of mental health problems (he initially denied mental health history, but then said he had attempted suicide once by overdose on Aspirin when he was 21-26 yo), because he 'missed his mother and father' when he was in [place]) who was brought to the ER by EMS after he reportedly had walked into the Police station with a gun stating he wanted to end his life. The EMS had initially reported that he had walked into a bank with a gun, but when a call was place to verify the information, it was reported he had actually walked into the Police station with the gun.   While in the ER, the patient gave confusing information, and would not answer questions appropriately; at some point one note mentions though, \"Patient is A[facility]4. The patient states he does not rember the details of what happened. He states he has been overwhelmed at work. He claims he is in fear of losing his job because he thinks  His coworker is trying to get him fired. The patient states he is not feeling suicidal at all. He says he remembers feeling very overwhelmed he woke up for work this morning. He then goes on to day he was getting ready for work when it [employer] like a movie started playing in my head on a loop, every ten minutes. It was like I walked around all morning like a zombie because all I could see was that movie playing. \" The patient claims this has never happened to him. \"  He was initially admitted to a medical floor to r/o any medical illness as a possible cause of delirium, and was seen by Dr. Paul Agudelo there and he reported having been feeling depressed; he was medically cleared by both Internal Medicine and Neurology and transferred to . HISTORY OF PRESENT ILLNESS:      The patient is a 50 y.o. male with unclear past history of mental illness (it is unclear whether he had actually attempted suicide at the age of 21-26 yo, but he did report that though after initially denying mental health problems), who was admitted to the hospital for the above mentioned reasons. When interviewed today, the patient was quite disorganized in his discourse; he tended to be circumstantial and tangential, and at times with loose associations. He said, when asked what had led to his admission, that he is \"doing fine\" but was admitted because of \"taking out too many jobs, and not taking care of himself\". He said he remembers Rogelio garcia\" about his admission; he said he Ciarra Willi been overworked, overextended himself, had sleep deprivation, took a day off work, and went and did all kinds of things\", and then talked about \"the paranoia of my wireless router, [employer], [employer]\". He said he \"went to get help\" at the neighbor's house who \"got a camera\".  He then talked this visit. Neurologic Exam:   Muscle Strength & Tone: full ROM  Gait: normal gait   Involuntary Movements: No    Mental Status Examination:    Level of consciousness:   Moderate dysattention (reduced clarity of awareness with impaired ability to focus, sustain, or shift attention)   Appearance:  hospital attire and fair grooming  Behavior/Motor:  no abnormalities noted  Attitude toward examiner:  cooperative and good eye contact  Speech:  normal rate, normal volume, well articulated, hyperverbal and high productivity   Mood: anxious and depressed  Affect:  mood congruent and anxious  Thought processes:  tangential and circumstantial   Thought content:  Homocidal ideation denies  Suicidal Ideation:  denies suicidal ideation  Delusions:  no evidence of delusions  Perceptual Disturbance:  denies any perceptual disturbance  Cognition:  oriented to person, place, and time   Concentration distractible  Memory impaired recent memory  Insight limited   Judgement limited   Fund of Knowledge adequate        DIAGNOSIS:    Major Depressive Disorder, ?recurrent, severe with psychotic features vs. Substance Induced Mood and psychotic disorder  Alcohol use disorder  R/o Stimulant Use Disorder          RISK ASSESSMENT:    SUICIDE RISK ASSESSMENT: moderate to high  HOMICIDE: low  AGITATION/VIOLENCE: moderate  ELOPEMENT: moderate    LABS: REVIEWED TODAY:  Recent Labs     04/29/21  1200 04/29/21  1256 04/30/21  0706   WBC 5.7  --  5.4   HGB 15.0 14.5 14.2     --  235     Recent Labs     04/29/21  1256 04/29/21  1441 04/30/21  0505   NA  --  139 140   K  --  4.0 4.0   CL  --  105 103   CO2  --  22 25   BUN  --  7 7   CREATININE 0.6* 0.56* 0.62*   GLUCOSE  --  95 95     Recent Labs     04/29/21  1441 04/30/21  0505   BILITOT 0.5 0.7   ALKPHOS 51 54   AST 25 23   ALT 35 35     Lab Results   Component Value Date    LABAMPH Neg 04/29/2021    BARBSCNU Neg 04/29/2021    LABBENZ Neg 04/29/2021    LABMETH Neg 04/29/2021 dimension. There is a faint area of groundglass infiltrate within the base of the right upper lobe. . There are small areas of dependent atelectasis noted. No other focal parenchymal abnormalities no pleural effusions. No pneumothoraces. No significant periaortic, pretracheal, parahilar or subcarinal adenopathy. Within the field-of-view of the abdomen there is a small hiatal hernia. There is multilevel degenerative changes bridging ossified of the thoracic spine superpose upon a mild dorsal kyphosis. 1.  IMPRESSION: 2.  NO EVIDENCE OF CENTRAL OR SEGMENTAL PULMONARY EMBOLISM. 3.  THERE IS A PLEURAL-BASED MASS WITH SPICULATED APPEARANCE IN THE LATERAL ASPECT OF THE RIGHT LOWER LOBE. WILL NEED TO BE FOLLOW-UP TO FURTHER EVALUATE AS PER CRITERIA BELOW. 4.  VERY FAINT AREA OF GROUNDGLASS INFILTRATE WITHIN THE BASE THE RIGHT UPPER LOBE. ____________________________________________________________ Fleischner Society 2017 Guidelines for Management of Incidentally Detected Pulmonary Nodules in Adults A: Solid Nodules*      Single                Low risk**                     <6 mm     No routine follow-up                               6-8 mm     CT at 6-12 months, then consider CT at 18-24 months                 >8 mm     Consider CT at 3 months, PET/CT, or tissue sampling Nodules <6 mm do not require routine follow-up, but certain patients at high risk with suspicious nodule morphology, upper lobe location, or both may warrant 12-month follow-up (recommendation 1A). High risk**                <6 mm     Optional CT at 12 months               6-8 mm     CT at 6-12 months, then CT at 18-24 months                 >8 mm     Consider CT at 3 months, PET/CT, or tissue sampling Nodules <6 mm do not require routine follow-up, but certain patients at high risk with suspicious nodule morphology, upper lobe location, or both may warrant 12-month follow-up (recommendation 1A).       Multiple                         Low risk**                <6 mm     No routine follow-up               6-8 mm     CT at 3-6 months, then consider CT at 18-24 months                >8 mm     CT at 3-6 months, then  consider CT at 18-24 months Use most suspicious nodule as guide to management. Follow-up intervals may vary according to size and risk (recommendation 2A). High risk**                <6 mm     Optional CT at 12 months               6-8 mm     CT at 3-6 months, then at 18-24 months                >8 mm     CT at 3-6 months, then at 18-24 months Use most suspicious nodule as guide to management. Follow-up intervals may vary according to size and risk (recommendation 2A). B: Subsolid Nodules*      Single                  Ground glass                <6 mm     No routine follow-up              >=6 mm     CT at 6-12 months to confirm persistence, then CT  every 2 years until 5 years In certain suspicious nodules <6 mm, consider follow-up at 2 and 4 years. If solid component(s) or growth develops, consider resection. (Recommendations 3A and 4A). Part solid                <6 mm     No routine follow-up              >=6 mm     CT at 3-6 months to confirm persistence. If unchanged and solid component remains <6 mm, annual CT  should be performed for 5 years. In practice, part-solid nodules cannot be defined as such until >=6 mm, and nodules <6 mm do not usually require follow-up. Persistent part-solid nodules with solid components >=6 mm should be considered highly suspicious (recommendations 4A-4C)      Multiple                <6 mm     CT at 3-6 months. If stable, consider CT at 2 and 4 years. >=6 mm     CT at 3-6 months. Subsequent management based  on the most suspicious nodule(s). Multiple <6 mm pure ground-glass nodules are usually benign, but consider follow-up in selected patients at high risk at 2 and 4 years (recommendation 5A). Note. --These recommendations do not apply to lung cancer screening, patients with immunosuppression, or patients with known primary cancer. * Dimensions are average of long and short axes, rounded to the nearest millimeter. ** Consider all relevant risk factors (see Risk Factors). ____________________________________________________________ All CT scans at this facility use dose modulation, iterative reconstruction, and/or weight based dosing when appropriate to reduce radiation dose to as low as reasonably achievable. Mri Brain W Wo Contrast    Result Date: 4/30/2021  MRI BRAIN W WO CONTRAST : 4/30/2021 CLINICAL HISTORY:  Acute encephalopathy, change in mental status confusion . COMPARISON: Head CT 4/29/2021. TECHNIQUE: Multiplanar MR imaging of the head was performed before and after intravenous administration of approximately 20 mL of ProHance gadolinium contrast. FINDINGS: Motion artifact mild to moderately limits detail of some sequences. There is no infarct, intracranial hemorrhage, abnormal enhancement, mass effect, midline shift, extra-axial collection, or hydrocephalus. There is no significant atrophy or white matter changes, for age. NO ACUTE INTRACRANIAL PROCESS IDENTIFIED. EKG: TRACING REVIEWED    TREATMENT PLAN:    Risk Management:  close watch and suicide risk    Collateral Information:  Will obtain collateral information from the family or friends. Will obtain medical records as appropriate from out patient providers  Will consult the hospitalist for a physical exam to rule out any co-morbid physical condition. Home medication Reconciled       New Medications started during this admission :    Will start Zoloft for depression and Zyprexa for thought disorganization/?psychotic symptoms  Prn Haldol 5mg and Vistaril 50mg q6hr for extreme agitation. Trazodone as ordered for insomnia  Vistaril as ordered for anxiety  Discussed with the patient risk, benefit, alternative and common side effects for the  proposed medication treatment.  Patient is consenting to the treatment.     Psychotherapy:   Encourage participation in milieu and group therapy  Individual therapy as needed        Electronically signed by Sujit Wen MD on 5/1/2021 at 2:23 PM

## 2021-05-01 NOTE — PROGRESS NOTES
Subjective:      Patient ID: Bonita Rincon is a 50 y.o. male who presents today for confusion    HPI     Patient seen and examined for neurology follow-up for cephalopathy related to multiple stressors, anxiety, and depression. Patient is alert and oriented x3, in no acute distress, and cooperative. He remains paranoid and states he will only tell me the events that led up to hospitalization if nurses present. Reports that he believes that someone put something in his drink. Reports that he is having trouble sleeping in the hospital.  Patient denies visual field deficit, diplopia, headache, difficulty swallowing, limb weakness, bowel or bladder incontinence, or history of seizures. Exam is nonfocal.  No seizure activity reported. Patient appears to be pacing around and appears to be somewhat agitated    Review of Systems   Constitutional: Negative for fever. HENT: Negative for ear pain, tinnitus and trouble swallowing. Eyes: Negative for photophobia and visual disturbance. Respiratory: Negative for choking and shortness of breath. Cardiovascular: Negative for chest pain and palpitations. Gastrointestinal: Negative for nausea and vomiting. Musculoskeletal: Negative for back pain, gait problem, joint swelling, myalgias, neck pain and neck stiffness. Skin: Negative for color change. Allergic/Immunologic: Negative for food allergies. Neurological: Negative for dizziness, tremors, seizures, syncope, facial asymmetry, speech difficulty, weakness, light-headedness, numbness and headaches. Psychiatric/Behavioral: Positive for sleep disturbance. Negative for behavioral problems, confusion and hallucinations. The patient is nervous/anxious. She is very anxious    History reviewed. No pertinent past medical history.   Past Surgical History:   Procedure Laterality Date    HERNIA REPAIR  2011    inguinal    TUMOR EXCISION  2003    Right lung Lining     Social History     Socioeconomic History    sodium chloride flush 0.9 % injection 5-40 mL  5-40 mL Intravenous PRN Madison Shelling, APRN - CNP        0.9 % sodium chloride infusion  25 mL Intravenous PRN Madison Shelling, APRN - CNP        enoxaparin (LOVENOX) injection 40 mg  40 mg Subcutaneous Daily Madison Shelling, APRN - CNP   40 mg at 04/30/21 1011    promethazine (PHENERGAN) tablet 12.5 mg  12.5 mg Oral Q6H PRN Madison Shelling, APRN - CNP        Or    ondansetron (ZOFRAN) injection 4 mg  4 mg Intravenous Q6H PRN Madison Shelling, APRN - CNP        polyethylene glycol (GLYCOLAX) packet 17 g  17 g Oral Daily PRN Madison Shelling, APRN - CNP        acetaminophen (TYLENOL) tablet 650 mg  650 mg Oral Q6H PRN Madison Shelling, APRN - CNP        Or    acetaminophen (TYLENOL) suppository 650 mg  650 mg Rectal Q6H PRN Madison Shelling, APRN - CNP        albuterol (PROVENTIL) nebulizer solution 2.5 mg  2.5 mg Nebulization Q2H PRN Madison Shelling, APRN - CNP        sodium chloride flush 0.9 % injection 5-40 mL  5-40 mL Intravenous 2 times per day Jovana Matas, APRN - NP   10 mL at 04/30/21 1012    sodium chloride flush 0.9 % injection 5-40 mL  5-40 mL Intravenous PRN Jovana Matas, APRN - NP        0.9 % sodium chloride infusion  25 mL Intravenous PRN Jovana Kimble, APRN - NP        therapeutic multivitamin-minerals 1 tablet  1 tablet Oral Daily Jovana Matas, APRN - NP   1 tablet at 04/30/21 1011    thiamine tablet 100 mg  100 mg Oral Daily Jovana Matas, APRN - NP   100 mg at 04/30/21 1011    LORazepam (ATIVAN) tablet 1 mg  1 mg Oral Q1H PRN Jovana Matas, APRN - NP        Or    LORazepam (ATIVAN) injection 1 mg  1 mg Intravenous Q1H PRN Jovana Kimble, APRN - NP        Or    LORazepam (ATIVAN) tablet 2 mg  2 mg Oral Q1H PRN Jovana Matas, APRN - NP        Or    LORazepam (ATIVAN) injection 2 mg  2 mg Intravenous Q1H PRN TE William - NP   2 mg at 04/29/21 2042    Or    LORazepam (ATIVAN) tablet 3 mg  3 mg Oral Q1H PRN Jovana Kimble APRN - NP        Or    LORazepam (ATIVAN) injection 3 mg  3 mg Intravenous Q1H PRN Leocadia Yessenia, APRN - NP        Or    LORazepam (ATIVAN) tablet 4 mg  4 mg Oral Q1H PRN Leocadia Yessenia, APRN - NP        Or    LORazepam (ATIVAN) injection 4 mg  4 mg Intravenous Q1H PRN Leocadia Yessenia, APRN - NP        folic acid (FOLVITE) tablet 1 mg  1 mg Oral Daily Leocadia Yessenia, APRN - NP   1 mg at 04/30/21 1011        Objective:   BP (!) 149/87   Pulse 65   Temp 97.7 °F (36.5 °C) (Oral)   Resp 18   Ht 6' 1\" (1.854 m)   Wt 248 lb 10.9 oz (112.8 kg)   SpO2 97%   BMI 32.81 kg/m²     Physical Exam  Vitals signs reviewed. Eyes:      Pupils: Pupils are equal, round, and reactive to light. Neck:      Musculoskeletal: Normal range of motion. Cardiovascular:      Rate and Rhythm: Normal rate and regular rhythm. Heart sounds: No murmur. Pulmonary:      Effort: Pulmonary effort is normal.      Breath sounds: Normal breath sounds. Abdominal:      General: Bowel sounds are normal.   Musculoskeletal: Normal range of motion. Skin:     General: Skin is warm. Neurological:      Mental Status: He is alert and oriented to person, place, and time. Cranial Nerves: No cranial nerve deficit. Sensory: No sensory deficit. Motor: No abnormal muscle tone. Coordination: Coordination normal.      Deep Tendon Reflexes: Reflexes are normal and symmetric. Babinski sign absent on the right side. Babinski sign absent on the left side. Psychiatric:         Mood and Affect: Mood normal.       Exam is nonfocal and normal examination except for these underlying anxiety    Ct Head Wo Contrast    Result Date: 4/29/2021  CT HEAD WO CONTRAST : 4/29/2021 CLINICAL HISTORY:  confused, low grade fever; . COMPARISON: None available. TECHNIQUE: Spiral unenhanced images were obtained of the head, with routine multiplanar reconstructions performed.  All CT scans at this facility use dose modulation, iterative reconstruction, and/or weight based dosing when appropriate to reduce radiation dose to as low as reasonably achievable. FINDINGS: There is no intracranial hemorrhage, mass effect, midline shift, extra-axial collection, evidence of hydrocephalus, recent ischemic infarct, or skull fracture identified. There is no significant atrophy or white matter changes, for age. The mastoid air cells and visualized paranasal sinuses are essentially clear. NO ACUTE INTRACRANIAL PROCESS IDENTIFIED. Cta Chest W Wo Contrast    Result Date: 4/29/2021  The EXAMINATION: CT scan of the chest with contrast (pulmonary embolism protocol) INDICATION: Tachypnea, fever, change in mental status, hypoxia. COMPARISON: None TECHNIQUE: Helical CT was performed through the chest utilizing 100 cc of Isovue-300 intravenous contrast.  Images were obtained with bolus tracking in order to opacify the pulmonary arteries. Both MIP and 3D volume rendered reconstructions were performed. FINDINGS: This is a limited examination due to suboptimal opacification. Within the limits of examination there are no gross central or gross proximal port emboli. There is a pleural-based spiculated mass at the lateral aspect of the right lower lobe series 3 image 33. It measures 9.5 x 1.8 cm in the transverse and AP dimension. There is a faint area of groundglass infiltrate within the base of the right upper lobe. . There are small areas of dependent atelectasis noted. No other focal parenchymal abnormalities no pleural effusions. No pneumothoraces. No significant periaortic, pretracheal, parahilar or subcarinal adenopathy. Within the field-of-view of the abdomen there is a small hiatal hernia. There is multilevel degenerative changes bridging ossified of the thoracic spine superpose upon a mild dorsal kyphosis. 1.  IMPRESSION: 2.  NO EVIDENCE OF CENTRAL OR SEGMENTAL PULMONARY EMBOLISM.  3.  THERE IS A PLEURAL-BASED MASS WITH SPICULATED APPEARANCE IN THE LATERAL ASPECT OF THE Subsolid Nodules*      Single                  Ground glass                <6 mm     No routine follow-up              >=6 mm     CT at 6-12 months to confirm persistence, then CT  every 2 years until 5 years In certain suspicious nodules <6 mm, consider follow-up at 2 and 4 years. If solid component(s) or growth develops, consider resection. (Recommendations 3A and 4A). Part solid                <6 mm     No routine follow-up              >=6 mm     CT at 3-6 months to confirm persistence. If unchanged and solid component remains <6 mm, annual CT  should be performed for 5 years. In practice, part-solid nodules cannot be defined as such until >=6 mm, and nodules <6 mm do not usually require follow-up. Persistent part-solid nodules with solid components >=6 mm should be considered highly suspicious (recommendations 4A-4C)      Multiple                <6 mm     CT at 3-6 months. If stable, consider CT at 2 and 4 years. >=6 mm     CT at 3-6 months. Subsequent management based  on the most suspicious nodule(s). Multiple <6 mm pure ground-glass nodules are usually benign, but consider follow-up in selected patients at high risk at 2 and 4 years (recommendation 5A). Note. --These recommendations do not apply to lung cancer screening, patients with immunosuppression, or patients with known primary cancer. * Dimensions are average of long and short axes, rounded to the nearest millimeter. ** Consider all relevant risk factors (see Risk Factors). ____________________________________________________________ All CT scans at this facility use dose modulation, iterative reconstruction, and/or weight based dosing when appropriate to reduce radiation dose to as low as reasonably achievable. Mri Brain W Wo Contrast    Result Date: 4/30/2021  MRI BRAIN W WO CONTRAST : 4/30/2021 CLINICAL HISTORY:  Acute encephalopathy, change in mental status confusion . COMPARISON: Head CT 4/29/2021.  TECHNIQUE: Multiplanar MR imaging of the head was performed before and after intravenous administration of approximately 20 mL of ProHance gadolinium contrast. FINDINGS: Motion artifact mild to moderately limits detail of some sequences. There is no infarct, intracranial hemorrhage, abnormal enhancement, mass effect, midline shift, extra-axial collection, or hydrocephalus. There is no significant atrophy or white matter changes, for age. NO ACUTE INTRACRANIAL PROCESS IDENTIFIED. Lab Results   Component Value Date    WBC 5.4 04/30/2021    RBC 4.80 04/30/2021    HGB 14.2 04/30/2021    HCT 41.4 04/30/2021    MCV 86.2 04/30/2021    MCH 29.5 04/30/2021    MCHC 34.2 04/30/2021    RDW 14.2 04/30/2021     04/30/2021     Lab Results   Component Value Date     04/30/2021    K 4.0 04/30/2021     04/30/2021    CO2 25 04/30/2021    BUN 7 04/30/2021    CREATININE 0.62 04/30/2021    GFRAA >60.0 04/30/2021    LABGLOM >60.0 04/30/2021    GLUCOSE 95 04/30/2021    PROT 7.2 04/30/2021    LABALBU 4.2 04/30/2021    CALCIUM 8.8 04/30/2021    BILITOT 0.7 04/30/2021    ALKPHOS 54 04/30/2021    AST 23 04/30/2021    ALT 35 04/30/2021     Lab Results   Component Value Date    PROTIME 14.4 04/29/2021    INR 1.1 04/29/2021     Lab Results   Component Value Date    TSH 0.871 04/29/2021     Lab Results   Component Value Date    TRIG 124 05/30/2018    HDL 52 05/30/2018    LDLCALC 171 05/30/2018     Lab Results   Component Value Date    LABAMPH Neg 04/29/2021    BARBSCNU Neg 04/29/2021    LABBENZ Neg 04/29/2021    LABMETH Neg 04/29/2021    OPIATESCREENURINE Neg 04/29/2021    PHENCYCLIDINESCREENURINE Neg 04/29/2021    ETOH <10 04/29/2021     No results found for: LITHIUM, DILFRTOT, VALPROATE    Assessment:   Encephalopathy related to underlying multiple stressors and depression with anxiety. Neurologic examination is normal and as far as patient remains oriented this likely represents more a psychiatric disorder.   On close questioning there is more than I know and can tell as there is different stories and he is quite overwhelmed. We will arrange for a EEG and if this is normal and an MRI is normal and all his labs are normal we do not really think that this is organic. Need psychiatric input on the same. Patient's exam remains nonfocal.  Paranoia persist but patient is oriented. EEG was well regulated with no abnormalities. MRI had no concerning findings as well. Patient is stable from a neurological perspective and okay for transfer to psych inpatient. I have personally performed a face to face diagnostic evaluation on this patient, reviewed all data and investigations, and am the sole provider of all clinical decisions on the neurological status of this patient. Patient's examination is nonfocal and patient remains oriented. MRI is normal.  EEG is normal.  Given a well-regulated EEG recording with underlying mental status changes these findings are consistent with underlying primary psychiatric disorder. Patient will be transferred to [facility]. Florencio Jones MD, [address], American Board of Psychiatry & Neurology  Board Certified in Vascular Neurology  Board Certified in Neuromuscular Medicine  Certified in [facility]:

## 2021-05-01 NOTE — DISCHARGE INSTR - COC
Continuity of Care Form    Patient Name: Olegario Lewis   :  1965  MRN:  [id number]    Admit date:  2021  Discharge date:  21    Code Status Order: Full Code   Advance Directives:     Admitting Physician:  Jane Boast, DO  PCP: Anette Guzmán MD    Discharging Nurse: [facility] Unit/Room#: R065/O547-08  Discharging Unit Phone Number: [phone]    Emergency Contact:   Extended Emergency Contact Information  Primary Emergency Contact: Zina Leyva  Address: [place] Isaac [address] Phone: [phone]  Relation: Spouse  Secondary Emergency Contact: [address] [place] [address] Phone: [phone]  Relation: Brother/Sister  Preferred language: English   needed? No    Past Surgical History:  Past Surgical History:   Procedure Laterality Date    HERNIA REPAIR      inguinal    TUMOR EXCISION      Right lung Lining       Immunization History: There is no immunization history on file for this patient.     Active Problems:  Patient Active Problem List   Diagnosis Code    Confusion R41.0    Hallucination R44.3       Isolation/Infection:   Isolation          No Isolation        Patient Infection Status     Infection Onset Added Last Indicated Last Indicated By Review Planned Expiration Resolved Resolved By    None active    Resolved    COVID-19 Rule Out 21 COVID-19, Rapid (Ordered)   21 Rule-Out Test Resulted          Nurse Assessment:  Last Vital Signs: BP (!) 149/87   Pulse 65   Temp 97.7 °F (36.5 °C) (Oral)   Resp 18   Ht 6' 1\" (1.854 m)   Wt 248 lb 10.9 oz (112.8 kg)   SpO2 97%   BMI 32.81 kg/m²     Last documented pain score (0-10 scale): Pain Level: 0  Last Weight:   Wt Readings from Last 1 Encounters:   21 248 lb 10.9 oz (112.8 kg)     Mental Status:  oriented and alert    IV Access:  - None    Nursing Mobility/ADLs:  Walking   Independent  Transfer  Independent  Bathing Independent  Dressing  Independent  Toileting  Independent  Feeding  Independent  Med Admin  Assisted  Med Delivery   whole    Wound Care Documentation and Therapy:        Elimination:  Continence:   · Bowel: Yes  · Bladder: Yes  Urinary Catheter: None   Colostomy/Ileostomy/Ileal Conduit: No       Date of Last BM: 4/30/21    Intake/Output Summary (Last 24 hours) at 5/1/2021 1041  Last data filed at 5/1/2021 0904  Gross per 24 hour   Intake 240 ml   Output --   Net 240 ml     I/O last 3 completed shifts: In: 10 [I.V.:10]  Out: -     Safety Concerns:     suicide    Impairments/Disabilities:      None    Nutrition Therapy:  Current Nutrition Therapy:   - Oral Diet:  General    Routes of Feeding: Oral  Liquids: Thin Liquids  Daily Fluid Restriction: no  Last Modified Barium Swallow with Video (Video Swallowing Test): not done    Treatments at the Time of Hospital Discharge:   Respiratory Treatments: ***  Oxygen Therapy:  is not on home oxygen therapy.   Ventilator:    - No ventilator support    Rehab Therapies: ***  Weight Bearing Status/Restrictions: No weight bearing restirctions  Other Medical Equipment (for information only, NOT a DME order):  ***  Other Treatments: ***    Patient's personal belongings (please select all that are sent with patient):  None    RN SIGNATURE:  Electronically signed by Tiarra Hall RN on 5/1/21 at 10:43 AM EDT    CASE MANAGEMENT/SOCIAL WORK SECTION    Inpatient Status Date: ***    Readmission Risk Assessment Score:  Readmission Risk              Risk of Unplanned Readmission:        10           Discharging to Facility/ Agency   · Name:   · Address:  · Phone:  · Fax:    Dialysis Facility (if applicable)   · Name:  · Address:  · Dialysis Schedule:  · Phone:  · Fax:    / signature: {Esignature:[id number]}    PHYSICIAN SECTION    Prognosis: {Prognosis:1756234803}    Condition at Discharge: [facility] Patient Condition:135231763}    Rehab Potential (if transferring to Rehab): {Prognosis:5437074813}    Recommended Labs or Other Treatments After Discharge: ***    Physician Certification: I certify the above information and transfer of Everton He  is necessary for the continuing treatment of the diagnosis listed and that he requires {Admit to Appropriate Level of Care:84895} for {GREATER/LESS:879368504} 30 days.      Update Admission H&P: {CHP DME Changes in [facility]:513602023}    PHYSICIAN SIGNATURE:  {Esignature:[id number]}

## 2021-05-01 NOTE — PROGRESS NOTES
Patient was laying in bed in his room. Patient was awake and alert. He was anxious, slightly irritable, was worrisome, had a sad affect and was tearful at times. Patient had a hard time answering the questions directly and would make statements that did not make sense. He was guarded and would not elaborate. Patient stated he is here because of certain steps but did not want to talk about it. He stated he is depressed, stressed and overwhelmed with life. He talk a lot about work and talk about a co worker killing himself but than he jump to another subject at work. He drinks alcohol daily and stated he does not take drugs but at the same time says \"I won't take too much. \"  Patient stated his sleeping fluctuates but at the same times says he takes just naps when he can. His appetite is fine. He denies any auditory or visual hallucinations. He denies being suicidal.  He has no interests but he likes golfing but it has been awhile.  Electronically signed by Nori Dennis, [address] on 5/1/2021 at 3:16 PM

## 2021-05-01 NOTE — PROGRESS NOTES
Pt assessment complete. Pt is calm and cooperative at this time. 1:1 at bedside for safety. Resting in bed with no distress. Requesting eye drops for dry eye, will perfect serve. Call light in reach. 0030:  Pt medicated with melatonin for sleep. Denies further needs at this time beside desire to sleep. CIWA preformed. Call light in reach. 1:1 at bedside. 0500:  Pt ambulated in the bustos. Attempted CIWA but pt refused to answer questions and states he doesn't need anything and won't take anything. Pt is cooperative, but appears anxious at this time.

## 2021-05-01 NOTE — FLOWSHEET NOTE
Pt arrived on 3 W from 2W via wheelchair. Pt ambulated to room. Skin check completed by two RN's. No areas of concern. Contraband check completed. Pt oriented to room and unit. Pt wears glasses. Pt given toiletries.

## 2021-05-02 LAB
ALBUMIN SERPL-MCNC: 4.1 G/DL (ref 3.5–4.6)
ALP BLD-CCNC: 60 U/L (ref 35–104)
ALT SERPL-CCNC: 40 U/L (ref 0–41)
ANION GAP SERPL CALCULATED.3IONS-SCNC: 10 MEQ/L (ref 9–15)
AST SERPL-CCNC: 20 U/L (ref 0–40)
BILIRUB SERPL-MCNC: 0.5 MG/DL (ref 0.2–0.7)
BUN BLDV-MCNC: 11 MG/DL (ref 6–20)
CALCIUM SERPL-MCNC: 9.2 MG/DL (ref 8.5–9.9)
CHLORIDE BLD-SCNC: 105 MEQ/L (ref 95–107)
CHOLESTEROL, TOTAL: 217 MG/DL (ref 0–199)
CO2: 25 MEQ/L (ref 20–31)
CREAT SERPL-MCNC: 0.7 MG/DL (ref 0.7–1.2)
GFR AFRICAN AMERICAN: >60
GFR NON-AFRICAN AMERICAN: >60
GLOBULIN: 3.4 G/DL (ref 2.3–3.5)
GLUCOSE BLD-MCNC: 100 MG/DL (ref 70–99)
HDLC SERPL-MCNC: 47 MG/DL (ref 40–59)
LDL CHOLESTEROL CALCULATED: 153 MG/DL (ref 0–129)
POTASSIUM REFLEX MAGNESIUM: 4.5 MEQ/L (ref 3.4–4.9)
SODIUM BLD-SCNC: 140 MEQ/L (ref 135–144)
TOTAL PROTEIN: 7.5 G/DL (ref 6.3–8)
TRIGL SERPL-MCNC: 84 MG/DL (ref 0–150)

## 2021-05-02 PROCEDURE — 80061 LIPID PANEL: CPT

## 2021-05-02 PROCEDURE — 36415 COLL VENOUS BLD VENIPUNCTURE: CPT

## 2021-05-02 PROCEDURE — 80053 COMPREHEN METABOLIC PANEL: CPT

## 2021-05-02 PROCEDURE — 1240000000 HC EMOTIONAL WELLNESS R&B

## 2021-05-02 PROCEDURE — 6370000000 HC RX 637 (ALT 250 FOR IP): Performed by: PSYCHIATRY & NEUROLOGY

## 2021-05-02 PROCEDURE — 6370000000 HC RX 637 (ALT 250 FOR IP): Performed by: INTERNAL MEDICINE

## 2021-05-02 RX ORDER — OLANZAPINE 10 MG/1
10 TABLET ORAL NIGHTLY
Status: DISCONTINUED | OUTPATIENT
Start: 2021-05-02 | End: 2021-05-07 | Stop reason: HOSPADM

## 2021-05-02 RX ADMIN — MAGNESIUM HYDROXIDE/ALUMINUM HYDROXICE/SIMETHICONE 30 ML: 120; 1200; 1200 SUSPENSION ORAL at 13:08

## 2021-05-02 RX ADMIN — OLANZAPINE 10 MG: 10 TABLET, FILM COATED ORAL at 21:06

## 2021-05-02 RX ADMIN — Medication 100 MG: at 09:18

## 2021-05-02 RX ADMIN — CARBOXYMETHYLCELLULOSE SODIUM 1 DROP: 0.5 SOLUTION/ DROPS OPHTHALMIC at 15:09

## 2021-05-02 RX ADMIN — TRAZODONE HYDROCHLORIDE 50 MG: 50 TABLET ORAL at 21:06

## 2021-05-02 RX ADMIN — FOLIC ACID 1 MG: 1 TABLET ORAL at 09:18

## 2021-05-02 RX ADMIN — SERTRALINE HYDROCHLORIDE 25 MG: 25 TABLET ORAL at 09:18

## 2021-05-02 RX ADMIN — Medication 5 MG: at 21:06

## 2021-05-02 RX ADMIN — THERA TABS 1 TABLET: TAB at 09:18

## 2021-05-02 ASSESSMENT — LIFESTYLE VARIABLES: HISTORY_ALCOHOL_USE: NO

## 2021-05-02 NOTE — CARE COORDINATION
BHI Biopsychosocial Assessment    Current Level of Psychosocial Functioning     Independent x  Dependent    Minimal Assist     Comments:  Patient is employed full time with employer sponsored benefit. He lives with his wife and one son. Patient does not receive any government assistance at this time. Psychosocial High Risk Factors (check all that apply)    Unable to obtain meds   Chronic illness/pain    Substance abuse   Lack of Family Support   Financial stress   Isolation   Inadequate Community Resources x  Suicide attempt(s)  Not taking medications x  Victim of crime   Developmental Delay  Unable to manage personal needs    Age 72 or older   Homeless  No transportation   Readmission within 30 days  Unemployment  Traumatic Event    Comments: Patient has at least two high risk factors associated with this admission. Psychiatric Advanced Directives: None Reported. Family to Involve in Treatment: Patient provided his brother's contact information to complete collateral.     Sexual Orientation:  Patient is currently in a heterosexual relationship. Patient Strengths: Patient is bright and articulate. Patient Barriers: Patient does not have a community mental health provider. Opiate Education Provided:  N/A    CMHC/mental health history: None Reported. Plan of Care   medication management, group/individual therapies, family meetings, psycho -education, treatment team meetings to assist with stabilization    Initial Discharge Plan:  Patient will return home and follow the recommendations of the treatment team.       Clinical Summary:    Patient is a 50year old male who was admitted to the [facility] due to a complaint of altered mental status. Reportedly, patient went to the police station and asked to be killed. When interviewed, patient communicated with rapid pressured speech and presented with flight of ideas. Patient was not forthcoming about this admission.  His primary focus was on his mother and his family. Patient seemed proud to shared his long and successful work history. He stated that sometimes work is difficult but there are also times when he feels good about going to work. Patient also shared pride in his family of origin and his father who is now . He stated his father worked hard to take care of his family. When completing the safety plan, patient stated he has several activities, interests, and family support to assist with maintaining his emotional stability. Patient ended session on a positive note.     Electronically signed by Evette Monreal on 2021 at 9:49 AM

## 2021-05-02 NOTE — GROUP NOTE
Group Therapy Note    Date: 5/2/2021    Group Start Time: 1100  Group End Time: 1150  Group Topic: Psychoeducation    [facility] 3W HERO Pacheco        Group Therapy Note    Attendees: 7         Patient's Goal:  \"Work with the program and contribute\"    Notes:  Patient was calm, attentive and work well on his task in group. He was talkative and sociable with his peers. Status After Intervention:  Unchanged    Participation Level:  Active Listener    Participation Quality: Appropriate      Speech:  normal      Thought Process/Content: Linear      Affective Functioning: Congruent      Mood: calm      Level of consciousness:  Alert      Response to Learning: Progressing to goal      Endings: None Reported    Modes of Intervention: Education, Socialization and Activity      Discipline Responsible: Psychoeducational Specialist      Signature:  Zach Pacheco

## 2021-05-02 NOTE — GROUP NOTE
Group Therapy Note    Date: 5/2/2021    Group Start Time: 1600  Group End Time: 0709  Group Topic: Recreational    [facility] 3W I    Nga Hurt        Group Therapy Note    Attendees: 17/20         Patient's Goal:  To participate in one of the various activities and games happening with the group. Notes:  Patient participated in group.     Status After Intervention:  Improved    Participation Level: Interactive    Participation Quality: Appropriate and Attentive      Speech:  normal      Thought Process/Content: Logical      Affective Functioning: Congruent      Mood: euthymic      Level of consciousness:  Alert and Attentive      Response to Learning: Progressing to goal      Endings: None Reported    Modes of Intervention: Activity      Discipline Responsible: [employer]      Signature:  Nga Hurt

## 2021-05-02 NOTE — PROGRESS NOTES
BEHAVIORAL HEALTH FOLLOW-UP NOTE     5/2/2021  THE PATIENT WAS SEEN THROUGH TELEPSYCHIATRY, WITH THE PATIENT IN [place], [facility] [facility] [address] THE PROVIDER IN [place], [place]   Patient was seen and examined in person, Chart reviewed   Patient's case discussed with staff/team    Chief Complaint: suicidal ideation, ? psychosis    Interim History:     Patient said he was able to sleep, intermittently. He said he had initially been scared to be admitted here, thinking that he would be locked up, but then realized he had the freedom to walk around. He talked about trying to manage his \"sleep schedule, work schedule, and his schedule\". He said his appetite was \"not bad\", and that his mood had been \"up and down\". He said, when asked about suicidal ideation, that he \"doesn't even want to dabble in it\". 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 He also had a liaison with a female [employer], who had been very insistent on getting in touch with him even when he was at home, and his wife found out. He was also stressed out about some new training that he was supposed to get at [employer], which he could not attend because of lack of cooperation/coordination from the [address] [facility] [address] people, but which caused him to feel blamed at work. He talked about some other incidents at work, including one with another employee who had a parcel which he held very closely, and had asked him for a tape gun; he then started thinking that the parcel might have ?drugs in it or may have had drugs like Fentanyl on the outside, and he was afraid he might have somehow touched it and gotten contaminated with it.  He again said he had wanted help, so he went to his neighbor's house, but was unable to get in touch with the neighbor, then went to his wife's workplace, trying to talk to her, but was not able to, so he went 'next door' and wrote a note asking for help, and \"the woman called 911\". He is not sure whether he had said he had a gun. He then talked about walking out, and seeing a , and \"flagging him down for help\", then going into a Police station. He would not answer clearly questions about having hallucinations; instead he talked about the sense of failure he had when an ex-union member (ex-[employer] worker) had committed suicide after having lost his job (the patient had tried to help him win his case with [employer] for a DUI, albeit unsuccessfully). He also talked about having been afraid that his Wifi connection at home might have been hacked. Appetite:   [x] Normal/Unchanged  [] Increased  [] Decreased      Sleep:       [] Normal/Unchanged  [x] Fair       [] Poor              Energy:    [] Normal/Unchanged  [x] Increased  [] Decreased        SI [] Present  [x] Denies    HI  []Present  [x] Absent     Aggression:  [] yes  [] no    Patient is [] able  [x] unable to CONTRACT FOR SAFETY     PAST MEDICAL/PSYCHIATRIC HISTORY:   No past medical history on file. FAMILY/SOCIAL HISTORY:  No family history on file. Social History     Socioeconomic History    Marital status:      Spouse name: Not on file    Number of children: Not on file    Years of education: Not on file    Highest education level: Not on file   Occupational History    Not on file   Social Needs    Financial resource strain: Not on file    Food insecurity     Worry: Not on file     Inability: Not on file    Transportation needs     Medical: Not on file     Non-medical: Not on file   Tobacco Use    Smoking status: Never Smoker    Smokeless tobacco: Never Used   Substance and Sexual Activity    Alcohol use:  Yes     Alcohol/week: 5.0 standard drinks     Types: 5 Cans of beer per week    Drug use: No    Sexual activity: Not on file   Lifestyle    Physical activity     Days per week: Not on file Minutes per session: Not on file    Stress: Not on file   Relationships    Social connections     Talks on phone: Not on file     Gets together: Not on file     Attends Yazidism service: Not on file     Active member of club or organization: Not on file     Attends meetings of clubs or organizations: Not on file     Relationship status: Not on file    Intimate partner violence     Fear of current or ex partner: Not on file     Emotionally abused: Not on file     Physically abused: Not on file     Forced sexual activity: Not on file   Other Topics Concern    Not on file   Social History Narrative    Not on file           ROS:  [x] All negative/unchanged except if checked.  Explain positive(checked items) below:  [] Constitutional  [] Eyes  [] Ear/Nose/Mouth/Throat  [] Respiratory  [] CV  [] GI  []   [] Musculoskeletal  [] Skin/Breast  [] Neurological  [] Endocrine  [] Heme/Lymph  [] Allergic/Immunologic    Explanation:     MEDICATIONS:    Current Facility-Administered Medications:     OLANZapine (ZYPREXA) tablet 10 mg, 10 mg, Oral, Nightly, Laura Alexander MD    carboxymethylcellulose PF (REFRESH PLUS) 0.5 % ophthalmic solution 1 drop, 1 drop, Both Eyes, TID PRN, Jalil Flavors, DO, 1 drop at 05/02/21 1509    melatonin disintegrating tablet 5 mg, 5 mg, Oral, Nightly, Jalil Flavors, DO, 5 mg at 05/01/21 2124    acetaminophen (TYLENOL) tablet 650 mg, 650 mg, Oral, Q4H PRN, iJm Bailey MD    aluminum & magnesium hydroxide-simethicone (MAALOX) 200-200-20 MG/5ML suspension 30 mL, 30 mL, Oral, PRN, Jim Bailey MD, 30 mL at 05/02/21 1308    benztropine mesylate (COGENTIN) injection 2 mg, 2 mg, Intramuscular, BID PRN, Jim Bailey MD    haloperidol lactate (HALDOL) injection 5 mg, 5 mg, Intramuscular, Q4H PRN **OR** haloperidol (HALDOL) tablet 5 mg, 5 mg, Oral, Q4H PRN, Jim Bailey MD    hydrOXYzine (ATARAX) tablet 50 mg, 50 mg, Oral, TID PRN, Jim Bailey MD    magnesium hydroxide (MILK OF MAGNESIA) 400 MG/5ML suspension 30 mL, 30 mL, Oral, Daily PRN, Mauricio Miguel MD    traZODone (DESYREL) tablet 50 mg, 50 mg, Oral, Nightly PRN, Mauricio Miguel MD    [DISCONTINUED] acetaminophen (TYLENOL) tablet 650 mg, 650 mg, Oral, Q6H PRN **OR** acetaminophen (TYLENOL) suppository 650 mg, 650 mg, Rectal, Q6H PRN, Silvia Combs DO    folic acid (FOLVITE) tablet 1 mg, 1 mg, Oral, Daily, Silvia Combs DO, 1 mg at 05/02/21 1937    LORazepam (ATIVAN) tablet 1 mg, 1 mg, Oral, Q1H PRN **OR** LORazepam (ATIVAN) injection 1 mg, 1 mg, Intravenous, Q1H PRN **OR** LORazepam (ATIVAN) tablet 2 mg, 2 mg, Oral, Q1H PRN **OR** LORazepam (ATIVAN) injection 2 mg, 2 mg, Intravenous, Q1H PRN **OR** LORazepam (ATIVAN) tablet 3 mg, 3 mg, Oral, Q1H PRN **OR** LORazepam (ATIVAN) injection 3 mg, 3 mg, Intravenous, Q1H PRN **OR** LORazepam (ATIVAN) tablet 4 mg, 4 mg, Oral, Q1H PRN **OR** LORazepam (ATIVAN) injection 4 mg, 4 mg, Intravenous, Q1H PRN, Sahara Castro MD    thiamine tablet 100 mg, 100 mg, Oral, Daily, Sahara Castro MD, 100 mg at 05/02/21 6657    multivitamin 1 tablet, 1 tablet, Oral, Daily, Sahara Castro MD, 1 tablet at 05/02/21 7601      Examination:  BP (!) 156/99   Pulse 70   Temp 98.2 °F (36.8 °C) (Oral)   Resp 18   SpO2 96%   Gait - steady  Medication side effects(SE): none reported    Mental Status Examination:    Level of consciousness:  within normal limits   Appearance:  fair grooming and fair hygiene  Behavior/Motor:  Psychomotor restlessness  Attitude toward examiner:  cooperative and good eye contact  Speech:  spontaneous, rapid, hyperverbal, pressured and interrupting   Mood: elevated  Affect:  mood congruent and anxious  Thought processes:  illogical, tangential and circumstantial   Thought content:  Homocidal ideation denies  Suicidal Ideation:  denies suicidal ideation  Delusions:  paranoid  Perceptual Disturbance:  Unclear; his answers are not clear in spite of repeated attempts to redirect   Cognition:  oriented to person, place, and time   Concentration poor  Insight poor   Judgement poor     ASSESSMENT:   Patient symptoms are:  [] Well controlled  [] Improving  [] Worsening  [x] No change      Diagnosis:   Bipolar Disorder, mixed manic episode with psychotic features vs. Psychotic disorder, NOS  R/o Substance Induced Mood Disorder  Alcohol use disorder  Stimulant use disorder    LABS:    Recent Labs     04/30/21  0706   WBC 5.4   HGB 14.2        Recent Labs     04/30/21  0505 05/02/21  0527    140   K 4.0 4.5    105   CO2 25 25   BUN 7 11   CREATININE 0.62* 0.70   GLUCOSE 95 100*     Recent Labs     04/30/21  0505 05/02/21  0527   BILITOT 0.7 0.5   ALKPHOS 54 60   AST 23 20   ALT 35 40     Lab Results   Component Value Date    LABAMPH Neg 04/29/2021    BARBSCNU Neg 04/29/2021    LABBENZ Neg 04/29/2021    LABMETH Neg 04/29/2021    OPIATESCREENURINE Neg 04/29/2021    PHENCYCLIDINESCREENURINE Neg 04/29/2021    ETOH <10 04/29/2021     Lab Results   Component Value Date    TSH 0.871 04/29/2021     No results found for: LITHIUM  No results found for: VALPROATE, CBMZ    RISK ASSESSMENT:   High risk of impulsive acting out    Treatment Plan:  Reviewed current Medications with the patient. Will d/c Zoloft due to current manic-like presentation. Will increase Zyprexa. Consider mood stabilizer, like Depakote or Lithium  Risks, benefits, side effects, drug-to-drug interactions and alternatives to treatment were discussed. Collateral information: pending  CD evaluation pending  Encourage patient to attend group and other milieu activities.   Discharge planning discussed with the patient and treatment team.    PSYCHOTHERAPY/COUNSELING:  [x] Therapeutic interview  [x] Supportive  [] CBT  [] Ongoing  [] Other    [x] Patient continues to need, on a daily basis, active treatment furnished directly by or requiring the supervision of inpatient psychiatric personnel Anticipated Length of stay:            Electronically signed by Marcia Gotti MD on 5/2/2021 at 8:32 PM        Electronically signed by Marcia Gotti MD on 5/2/2021 at 2:41 PM

## 2021-05-02 NOTE — SUICIDE SAFETY PLAN
SAFETY PLAN    A suicide Safety Plan is a document that supports someone when they are having thoughts of suicide. Warning Signs that indicate a suicidal crisis may be developing: What (situations, thoughts, feelings, body sensations, behaviors, etc.) do you experience that lets you know you are beginning to think about suicide? 1. Overwhelmed  2. Panic Attack  3. Depression    Internal Coping Strategies:  What things can I do (relaxation techniques, hobbies, physical activities, etc.) to take my mind off my problems without contacting another person? 1. Garden  2. Ride Tractor  3. Work Outside    [employer] and social settings that provide distraction: Who can I call or where can I go to distract me? 1. Name: Luc Rubio  Phone: [phone]  2. Name: Nixon Phone: [phone]   9. Place: Walk with the dog            4. Place: Walking in the neighbor    People whom I can ask for help: Who can I call when I need help - for example, friends, family, clergy, someone else? 1. Name: Roseline Smith             Phone: unsure  2. Name: Nixon Phone: [phone]  6. Name: Luc Rubio  Phone: [phone]    Professionals or [address] [place], [place] [address]vd I can contact during a crisis: Who can I call for help - for example, my doctor, my psychiatrist, my psychologist, a mental health provider, a suicide hotline? 1. Clinician Name: None. Phone: None. Clinician Pager or Emergency Contact #: None. 2. Suicide Prevention Lifeline: 1-105-106-TALK (2419)    3. [address] [place] [address] Emergency Services -  for example, [facility] suicide hotline, [address] [place] [address] Avenue: [facility]      Emergency Services Address: [address] [facility]      Emergency Services Phone: [phone]    Making the environment safe: How can I make my environment (house/apartment/living space) safer? For example, can I remove guns, medications, and other items? 1. Patient stated he does not have any guns in his home.    2. Patient lives with family and feels safe.

## 2021-05-02 NOTE — CONSULTS
[facility]  MEDICINE    HISTORY AND PHYSICAL EXAM    PATIENT NAME:  Rhiannon Davis    MRN:  [id number]  SERVICE DATE:  5/2/2021   SERVICE TIME:  8:21 AM    Primary Care Physician: Jus Lopez MD         SUBJECTIVE  CHIEF COMPLAINT:  Medically appropriate for inpatient psychiatry admission. Consult for medical H/P encounter. HPI:  This is a 50 y.o. male who presents with  PMHx R lung tumor, hernia repair, ETOH abuse. Transferred from  after treatment for acute encephalopathy and acute confusion. He was seen by neurology. Patient cleared from emergency room for psychiatric care. Patient Seen, Chart, Labs, Radiologystudies, and Consults reviewed. Patient denies headache, chest pain, shortness of breath, N/V/D/C, fever/chills. He was found to have  R sided spiculated lung mass. He was seen by pulmonary. Will need outpatient PET scan. PAST MEDICAL HISTORY:  No past medical history on file. PAST SURGICAL HISTORY:    Past Surgical History:   Procedure Laterality Date    HERNIA REPAIR  2011    inguinal    TUMOR EXCISION  2003    Right lung Lining     FAMILY HISTORY:  No family history on file. SOCIAL HISTORY:    Social History     Socioeconomic History    Marital status:      Spouse name: Not on file    Number of children: Not on file    Years of education: Not on file    Highest education level: Not on file   Occupational History    Not on file   Social Needs    Financial resource strain: Not on file    Food insecurity     Worry: Not on file     Inability: Not on file    Transportation needs     Medical: Not on file     Non-medical: Not on file   Tobacco Use    Smoking status: Never Smoker    Smokeless tobacco: Never Used   Substance and Sexual Activity    Alcohol use:  Yes     Alcohol/week: 5.0 standard drinks     Types: 5 Cans of beer per week    Drug use: No    Sexual activity: Not on file   Lifestyle    Physical activity     Days per week: Not on file     Minutes per session: Not on file    Stress: Not on file   Relationships    Social connections     Talks on phone: Not on file     Gets together: Not on file     Attends Muslim service: Not on file     Active member of club or organization: Not on file     Attends meetings of clubs or organizations: Not on file     Relationship status: Not on file    Intimate partner violence     Fear of current or ex partner: Not on file     Emotionally abused: Not on file     Physically abused: Not on file     Forced sexual activity: Not on file   Other Topics Concern    Not on file   Social History Narrative    Not on file     MEDICATIONS:    Current Facility-Administered Medications   Medication Dose Route Frequency Provider Last Rate Last Admin    carboxymethylcellulose PF (REFRESH PLUS) 0.5 % ophthalmic solution 1 drop  1 drop Both Eyes TID PRN Trish Crown, DO        melatonin disintegrating tablet 5 mg  5 mg Oral Nightly Trish Crown, DO   5 mg at 05/01/21 2124    acetaminophen (TYLENOL) tablet 650 mg  650 mg Oral Q4H PRN Ritesh Jaime MD        aluminum & magnesium hydroxide-simethicone (MAALOX) 200-200-20 MG/5ML suspension 30 mL  30 mL Oral PRN Ritesh Jaime MD        benztropine mesylate (COGENTIN) injection 2 mg  2 mg Intramuscular BID PRN Ritesh Jaime MD        haloperidol lactate (HALDOL) injection 5 mg  5 mg Intramuscular Q4H PRN Ritesh Jaime MD        Or    haloperidol (HALDOL) tablet 5 mg  5 mg Oral Q4H PRN Ritesh Jaime MD        hydrOXYzine (ATARAX) tablet 50 mg  50 mg Oral TID PRN Ritesh Jaime MD        magnesium hydroxide (MILK OF MAGNESIA) 400 MG/5ML suspension 30 mL  30 mL Oral Daily PRN Ritesh Jaime MD        traZODone (DESYREL) tablet 50 mg  50 mg Oral Nightly PRN Ritesh Jaime MD        acetaminophen (TYLENOL) suppository 650 mg  650 mg Rectal Q6H PRN Georgia Briceño DO        enoxaparin (LOVENOX) injection 40 mg  40 mg Subcutaneous Daily Trish Crown, DO  folic acid (FOLVITE) tablet 1 mg  1 mg Oral Daily Brenden Rose,         sertraline (ZOLOFT) tablet 25 mg  25 mg Oral Daily Sahara Castro MD   25 mg at 05/01/21 1233    OLANZapine (ZYPREXA) tablet 5 mg  5 mg Oral Nightly Sahara Castro MD   5 mg at 05/01/21 2124    LORazepam (ATIVAN) tablet 1 mg  1 mg Oral Q1H PRN Sahara Castro MD        Or    LORazepam (ATIVAN) injection 1 mg  1 mg Intravenous Q1H PRN Sahara Castro MD        Or    LORazepam (ATIVAN) tablet 2 mg  2 mg Oral Q1H PRN Sahara Castro MD        Or    LORazepam (ATIVAN) injection 2 mg  2 mg Intravenous Q1H PRN Sahara Castro MD        Or    LORazepam (ATIVAN) tablet 3 mg  3 mg Oral Q1H PRN Sahara Castro MD        Or    LORazepam (ATIVAN) injection 3 mg  3 mg Intravenous Q1H PRN Sahara Castro MD        Or    LORazepam (ATIVAN) tablet 4 mg  4 mg Oral Q1H PRN Sahara Castro MD        Or    LORazepam (ATIVAN) injection 4 mg  4 mg Intravenous Q1H PRN Sahara Castro MD        thiamine tablet 100 mg  100 mg Oral Daily Sahara Castro MD   100 mg at 05/01/21 1233    multivitamin 1 tablet  1 tablet Oral Daily Sahara Castro MD   1 tablet at 05/01/21 1233       ALLERGIES: Patient has no known allergies. REVIEW OF SYSTEM:   ROS as noted in HPI, 12 point ROS reviewed and otherwise negative.     OBJECTIVE  PHYSICAL EXAM: /63   Pulse 92   Temp 98.3 °F (36.8 °C) (Oral)   Resp 18   SpO2 95%   CONSTITUTIONAL:  awake, alert, cooperative, no apparent distress, and appears stated age  EYES:  Lids and lashes normal, conjunctiva normal  ENT:  Normocephalic, without obvious abnormality, atraumatic  NECK:  Supple, symmetrical, trachea midline, no adenopathy, thyroid symmetric, not enlarged and no tenderness, skin normal  LUNGS:  No increased work of breathing, good air exchange, clear to auscultation bilaterally  CARDIOVASCULAR:  Normal apical impulse, regular rate and rhythm  ABDOMEN: normal bowel sounds, soft, non-distended,

## 2021-05-02 NOTE — GROUP NOTE
Group Therapy Note    Date: 5/2/2021    Group Start Time: 9983  Group End Time: 1571  Group Topic: Healthy Living/Wellness    [facility]    Ed Oakes        Group Therapy Note    Attendees: 11/20         Patient's Goal:  To learn about positivity while playing a game. Notes:  Patient participated in discussion and activity.     Status After Intervention:  Unchanged    Participation Level: Interactive    Participation Quality: Appropriate, Attentive and Supportive      Speech:  normal      Thought Process/Content: Logical      Affective Functioning: Congruent      Mood: euthymic      Level of consciousness:  Alert and Attentive      Response to Learning: Progressing to goal      Endings: None Reported    Modes of Intervention: Education      Discipline Responsible: [address] [employer]      Signature:  Ed Oakes

## 2021-05-02 NOTE — GROUP NOTE
Group Therapy Note    Date: 5/2/2021    Group Start Time: 1000  Group End Time: 1030  Group Topic: Group Therapy    [facility]    NAHED De Jesus        Group Therapy Note    Attendees: 5         Patient's Goal: To participate in a goal oriented group. Notes:  Patient stated it was his second day on the unit and he wants to better understand how things work. Status After Intervention:  Unchanged    Participation Level: Active Listener    Participation Quality: Appropriate      Speech:  normal      Thought Process/Content: Logical      Affective Functioning: Congruent      Mood: anxious      Level of consciousness:  Alert      Response to Learning: Able to verbalize current knowledge/experience      Endings: None Reported    Modes of Intervention: Education      Discipline Responsible: /Counselor      Signature:   NAHED De Jesus

## 2021-05-02 NOTE — PROGRESS NOTES
Patient denies SI/HI or AVH. Patient talks incessantly, about how he has his hand in too many things and is overwhelmed. Pt stated, \"I needed help and I asked the lady at the bank, and stepped in front of the car. I dont know what I need, I may need to quit my job or I dont know. \"  Pt is manic wanting to shower, proceed with the program here. Patient tells this nurse that when he was in high school he played sports, did bowling, softball, athletic, would stay out late, then be unable to get up in the morning. Patient states he has a lot of started projects at home that he has not finished. \"But when I decide, man I can just knock it out. \"  Patient states \"I work on Mondays which is supposed to be my day off and I should just take the day off. \"  Patient states a couple of times as we are talking \"I am just going to follow your program here. \"  Pt \"like to put my 2, 3, or 5 cents in everywhere. \" pt is seen out on the unit, then laying in bed taking a break. Pt has a lot of energy.  Electronically signed by Eduin Clement LPN on 5/3/9676 at 12:41 AM

## 2021-05-03 PROBLEM — F31.64 BIPOLAR AFFECTIVE DISORDER, MIXED, SEVERE, WITH PSYCHOTIC BEHAVIOR (HCC): Status: ACTIVE | Noted: 2021-05-01

## 2021-05-03 PROCEDURE — 1240000000 HC EMOTIONAL WELLNESS R&B

## 2021-05-03 PROCEDURE — 6370000000 HC RX 637 (ALT 250 FOR IP): Performed by: INTERNAL MEDICINE

## 2021-05-03 PROCEDURE — 6370000000 HC RX 637 (ALT 250 FOR IP): Performed by: PSYCHIATRY & NEUROLOGY

## 2021-05-03 PROCEDURE — 99232 SBSQ HOSP IP/OBS MODERATE 35: CPT | Performed by: PSYCHIATRY & NEUROLOGY

## 2021-05-03 RX ADMIN — Medication 100 MG: at 08:38

## 2021-05-03 RX ADMIN — FOLIC ACID 1 MG: 1 TABLET ORAL at 08:38

## 2021-05-03 RX ADMIN — Medication 5 MG: at 21:45

## 2021-05-03 RX ADMIN — THERA TABS 1 TABLET: TAB at 08:38

## 2021-05-03 RX ADMIN — OLANZAPINE 10 MG: 10 TABLET, FILM COATED ORAL at 21:45

## 2021-05-03 NOTE — PROGRESS NOTES
Up in dining room socializing with peers. Fair eye contact, Attending groups. Denies SI/HI/AVH. No internal stimulation noted. Maintained concentration on task, alert and oriented. Poor insight to current hospitalization. Safety maintained.

## 2021-05-03 NOTE — GROUP NOTE
Group Therapy Note    Date: 5/2/2021    Group Start Time: 2055  Group End Time: 2105  Group Topic: Wrap-Up    [facility]    Tripp Natarajan        Group Therapy Note    Attendees: 7/20         Patient's Goal:  \"to take a shower\"    Notes:  Patient reported meeting their goal for the day.      Status After Intervention:  Unchanged    Participation Level: Interactive    Participation Quality: Appropriate, Attentive and Sharing      Speech:  normal      Thought Process/Content: Logical      Affective Functioning: Congruent      Mood: euthymic      Level of consciousness:  Alert and Attentive      Response to Learning: Progressing to goal      Endings: None Reported    Modes of Intervention: Support      Discipline Responsible: [employer]      Signature:  Tripp Natarajan

## 2021-05-03 NOTE — GROUP NOTE
Group Therapy Note    Date: 5/3/2021    Group Start Time: 1400  Group End Time: 1430  Group Topic: Healthy Living/Wellness    [facility]    Honey Richmond RN        Group Therapy Note    Attendees:9/18         Patient's Goal:   Socialization and supporting self and peers      Status After Intervention:  Unchanged    Participation Level:  Active Listener and Interactive    Participation Quality: Appropriate      Speech:  normal      Thought Process/Content: Logical      Affective Functioning: Congruent      Mood: euthymic      Level of consciousness:  Alert      Response to Learning: Able to verbalize current knowledge/experience and Able to retain information      Endings: None Reported    Modes of Intervention: Socialization and Exploration      Discipline Responsible: Registered Nurse      Signature:  Honey Richmond RN

## 2021-05-03 NOTE — PROGRESS NOTES
Pt. refused to attend the 1100 skills group, despite staff encouragement.  Electronically signed by Ramírez Edmond on 5/3/2021 at 1:06 PM

## 2021-05-03 NOTE — GROUP NOTE
Group Therapy Note    Date: 5/3/2021    Group Start Time: 1300  Group End Time: 2264  Group Topic: Cognitive Skills    [facility]    NAHED Fishman        Group Therapy Note    Attendees: 9         Patient's Goal:  To participate in mood management group. Notes:  Patient was highly verbal and very supportive of the group. He stated that part of his problem is taking care of others and not himself. Therapist encouraged patient to set a goal of taking care of himself. Status After Intervention:  Improved    Participation Level: Active Listener    Participation Quality: Appropriate      Speech:  normal      Thought Process/Content: Logical      Affective Functioning: Congruent      Mood: elevated      Level of consciousness:  Alert      Response to Learning: Able to verbalize current knowledge/experience      Endings: None Reported    Modes of Intervention: Education      Discipline Responsible: /Counselor      Signature:   NAHED Fishman

## 2021-05-03 NOTE — GROUP NOTE
Group Therapy Note    Date: 5/3/2021    Group Start Time: 1000  Group End Time: 6991  Group Topic: Psychotherapy    [facility]    JAH Romo LSW        Group Therapy Note    Attendees: 5/10         Patient's Goal:  To get connected with services and attend [facility] upon discharge for continued support. Notes:  Pt exhibited a range of emotion while in group today. He stated he likes to help others but seems to be overly involved with others eating habits and social situations. Pt appeared guarded when talking in group today and used metaphors when speaking instead of direct personal experience.     Status After Intervention:  Unchanged    Participation Level: Interactive    Participation Quality: Sharing      Speech:  pressured and loud      Thought Process/Content: Logical      Affective Functioning: Congruent      Mood: anxious, depressed and irritable      Level of consciousness:  Alert, Oriented x4 and Attentive      Response to Learning: Able to verbalize current knowledge/experience, Able to retain information, Capable of insight and Progressing to goal      Endings: None Reported    Modes of Intervention: Education, Support, Socialization, Exploration, Problem-solving and Activity      Discipline Responsible: /Counselor      Signature:  JAH Romo LSW

## 2021-05-03 NOTE — GROUP NOTE
Group Therapy Note    Date: 5/3/2021    Group Start Time: 8779  Group End Time: 1925  Group Topic: Recreational    [facility]    Davin Allen        Group Therapy Note    Attendees: 8/17         Patient's Goal:  To play Heads Up with the group. Notes:  Patient played the game with peers.     Status After Intervention:  Improved    Participation Level: Interactive    Participation Quality: Appropriate and Attentive      Speech:  normal      Thought Process/Content: Logical      Affective Functioning: Congruent      Mood: euthymic      Level of consciousness:  Alert and Attentive      Response to Learning: Progressing to goal      Endings: None Reported    Modes of Intervention: Activity      Discipline Responsible: [employer]      Signature:  Davin Allen

## 2021-05-03 NOTE — PROGRESS NOTES
Assessment completed to best ability. Pt had pressured speech and tangential. During interview pt states, \"lets get on with this confessional because too much talking gives him anxiety. \" Pt was evasive on some subjects but otherwise cooperative. Fidgety during interview. Discussed major stressor being work. Wound not elaborate on relationship with wife or events leading to admission. Denies [address]. Reports poor sleep and appetite. Appears well groomed. Out and social with peers, often \"making jokes to make this time here bearable. \" Pt made multiple comments regarding being [employer] and the impact it has had on his life but did not go into great detail. No concerns at this time. Will continue to monitor.

## 2021-05-03 NOTE — PROGRESS NOTES
Pt. attended the 0900 community meeting.  Electronically signed by Ervin Hdez on 5/3/2021 at 9:27 AM

## 2021-05-03 NOTE — CARE COORDINATION
FAMILY COLLATERAL NOTE    Family/Support Name: Ozzie Pantoja  Contact #: [phone]  Relationship to Pt[de-identified] wife        Family/Support contact aware of hospitalization: Yes    Presenting Symptoms/Current Concerns:  Per wife, patient has recently disclosed the affair he is having with a woman at work. He met with this woman the night before the episode. While disclosing about his affair, patient started becoming paranoid about the woman in question. He stated she is out to get him. To follow, was a phone call from the neighbor. Patient said to neighbors that he was going to kill himself. Shortly there after a call came from police. Patient went in a bank and gave the teller a note that he wanted to kill himself and then gestured like he wanted police to kill him. Top 3 Life Stressors: Too involved at work  Having an affair with a coworker  Suspected drug use/abuse    Background History Relevant to Current Hospitalization:  Patient has worked for [employer] for 32 years. 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 Past two years patient has been hyper focused on work as opposed as family. Relationship was wonderful until he got involved in the union. Patient has been cheating on wife for two years. Wife is extremely upset about the situation. At his best, patient is a very kind person. Patient has a lot of support. Family Mental Health/Substance Use History:   None. Support Network's Goal for Hospitalization:   Patient is very symptomatic. Decrease symptoms. Patient needs to be more focused on his family. Discharge Plan:   Patient will return home. Wife wants to fight for her relationship with her .      Support Network Supportive of Discharge Plan: Yes    Support can confirm Safety of Location and Security of Weapons:   Brother in law removed all weapons when patient was admitted to the hospital. Support agreeable to Safeguard and Monitor Medications (including Prescription and OTC): Wife is willing to safeguard and secure all medication. Identified Barriers to Compliance with Discharge Plan: Wife believes the woman with whom patient is having the affair is contacting him at the hospital. Wife has general power of , healthcare power of  and living will designation of agent. Signed and notarized. Recommendations for Support Network:   Please allow follow up contact when necessary.        NAHED Solorio

## 2021-05-03 NOTE — GROUP NOTE
Group Therapy Note    Date: 5/3/2021    Group Start Time: 9061  Group End Time: 1700  Group Topic: Healthy Living/Wellness    [facility] 3[facility] HERO Alonzo        Group Therapy Note    Attendees: 8/17         Patient's Goal:  To learn about nutrition and healthy eating. Notes:  Patient participated in group discussion.     Status After Intervention:  Improved    Participation Level: Interactive    Participation Quality: Appropriate and Attentive      Speech:  pressured      Thought Process/Content: Logical      Affective Functioning: Congruent      Mood: euthymic      Level of consciousness:  Alert and Attentive      Response to Learning: Able to verbalize current knowledge/experience      Endings: None Reported    Modes of Intervention: Education      Discipline Responsible: [address] [facility] [employer]      Signature:  Jair Alonzo

## 2021-05-03 NOTE — PROGRESS NOTES
Liyah Connell [facility]. FOLLOW-UP NOTE       5/3/2021     Patient was seen and examined in person, Chart reviewed   Patient's case discussed with staff/team    Chief Complaint: Depression, Psychosis    Interim History:     Pt remain labile with racing thoughts  Poor insight into the circumstances of admission other than saying that he was overwhelmed  Pt was sleep deprived  Mood swings with grandiosity  Medication was changed to zyprexa from zoloft  Pt denies any suicidal thoughts today  Has been attending groups  Appetite:   [] Normal/Unchanged  [] Increased  [x] Decreased      Sleep:       [] Normal/Unchanged  [x] Fair       [] Poor              Energy:    [] Normal/Unchanged  [] Increased  [x] Decreased        SI [] Present  [x] Absent    HI  []Present  [] Absent     Aggression:  [] yes  [x] no    Patient is [] able  [x] unable to CONTRACT FOR SAFETY     PAST MEDICAL/PSYCHIATRIC HISTORY:   No past medical history on file. FAMILY/SOCIAL HISTORY:  No family history on file. Social History     Socioeconomic History    Marital status:      Spouse name: Not on file    Number of children: Not on file    Years of education: Not on file    Highest education level: Not on file   Occupational History    Not on file   Social Needs    Financial resource strain: Not on file    Food insecurity     Worry: Not on file     Inability: Not on file    Transportation needs     Medical: Not on file     Non-medical: Not on file   Tobacco Use    Smoking status: Never Smoker    Smokeless tobacco: Never Used   Substance and Sexual Activity    Alcohol use:  Yes     Alcohol/week: 5.0 standard drinks     Types: 5 Cans of beer per week    Drug use: No    Sexual activity: Not on file   Lifestyle    Physical activity     Days per week: Not on file     Minutes per session: Not on file    Stress: Not on file   Relationships    Social connections     Talks on phone: Not on file     Gets together: Not on file     Attends Yazidism service: Not on file     Active member of club or organization: Not on file     Attends meetings of clubs or organizations: Not on file     Relationship status: Not on file    Intimate partner violence     Fear of current or ex partner: Not on file     Emotionally abused: Not on file     Physically abused: Not on file     Forced sexual activity: Not on file   Other Topics Concern    Not on file   Social History Narrative    Not on file           ROS:  [x] All negative/unchanged except if checked.  Explain positive(checked items) below:  [] Constitutional  [] Eyes  [] Ear/Nose/Mouth/Throat  [] Respiratory  [] CV  [] GI  []   [] Musculoskeletal  [] Skin/Breast  [] Neurological  [] Endocrine  [] Heme/Lymph  [] Allergic/Immunologic    Explanation:     MEDICATIONS:    Current Facility-Administered Medications:     OLANZapine (ZYPREXA) tablet 10 mg, 10 mg, Oral, Nightly, Efra Delarosa MD, 10 mg at 05/02/21 2106    carboxymethylcellulose PF (REFRESH PLUS) 0.5 % ophthalmic solution 1 drop, 1 drop, Both Eyes, TID PRN, Pitney Rose, DO, 1 drop at 05/02/21 1509    melatonin disintegrating tablet 5 mg, 5 mg, Oral, Nightly, Pitney Rose, DO, 5 mg at 05/02/21 2106    acetaminophen (TYLENOL) tablet 650 mg, 650 mg, Oral, Q4H PRN, Crystal Goltz, MD    aluminum & magnesium hydroxide-simethicone (MAALOX) 200-200-20 MG/5ML suspension 30 mL, 30 mL, Oral, PRN, Crystal Goltz, MD, 30 mL at 05/02/21 1308    benztropine mesylate (COGENTIN) injection 2 mg, 2 mg, Intramuscular, BID PRN, Crystal Goltz, MD    haloperidol lactate (HALDOL) injection 5 mg, 5 mg, Intramuscular, Q4H PRN **OR** haloperidol (HALDOL) tablet 5 mg, 5 mg, Oral, Q4H PRN, Crystal Goltz, MD    hydrOXYzine (ATARAX) tablet 50 mg, 50 mg, Oral, TID PRN, Crystal Goltz, MD    magnesium hydroxide (MILK OF MAGNESIA) 400 MG/5ML suspension 30 mL, 30 mL, Oral, Daily PRN, Crystal Goltz, MD    traZODone

## 2021-05-04 LAB
BLOOD CULTURE, ROUTINE: NORMAL
CULTURE, BLOOD 2: NORMAL

## 2021-05-04 PROCEDURE — 6370000000 HC RX 637 (ALT 250 FOR IP): Performed by: PSYCHIATRY & NEUROLOGY

## 2021-05-04 PROCEDURE — 6370000000 HC RX 637 (ALT 250 FOR IP): Performed by: INTERNAL MEDICINE

## 2021-05-04 PROCEDURE — 99232 SBSQ HOSP IP/OBS MODERATE 35: CPT | Performed by: PSYCHIATRY & NEUROLOGY

## 2021-05-04 PROCEDURE — 1240000000 HC EMOTIONAL WELLNESS R&B

## 2021-05-04 RX ORDER — DIVALPROEX SODIUM 500 MG/1
500 TABLET, EXTENDED RELEASE ORAL DAILY
Status: DISCONTINUED | OUTPATIENT
Start: 2021-05-04 | End: 2021-05-07 | Stop reason: HOSPADM

## 2021-05-04 RX ADMIN — Medication 5 MG: at 21:47

## 2021-05-04 RX ADMIN — THERA TABS 1 TABLET: TAB at 08:48

## 2021-05-04 RX ADMIN — OLANZAPINE 10 MG: 10 TABLET, FILM COATED ORAL at 21:47

## 2021-05-04 RX ADMIN — CARBOXYMETHYLCELLULOSE SODIUM 1 DROP: 0.5 SOLUTION/ DROPS OPHTHALMIC at 09:24

## 2021-05-04 RX ADMIN — Medication 100 MG: at 08:48

## 2021-05-04 RX ADMIN — FOLIC ACID 1 MG: 1 TABLET ORAL at 08:48

## 2021-05-04 RX ADMIN — DIVALPROEX SODIUM 500 MG: 500 TABLET, EXTENDED RELEASE ORAL at 14:10

## 2021-05-04 NOTE — PROGRESS NOTES
Pt attended 0900 morning community meeting.      Electronically signed by Nat Coburn OT on 5/4/2021 at 11:11 AM

## 2021-05-04 NOTE — GROUP NOTE
Group Therapy Note    Date: 5/3/2021    Group Start Time: 2055  Group End Time: 2105  Group Topic: Wrap-Up    [facility] 3[facility] BHANGELES Wilkins        Group Therapy Note    Attendees: 8/17         Patient's Goal:  \"to keep improving\"    Notes:  Patient reported meeting their goal for the day. Patient chose to participate in a deep breathing exercise following wrap up group.     Status After Intervention:  Unchanged    Participation Level: Interactive    Participation Quality: Appropriate, Attentive and Sharing      Speech:  normal      Thought Process/Content: Logical      Affective Functioning: Congruent      Mood: euthymic      Level of consciousness:  Alert and Attentive      Response to Learning: Progressing to goal      Endings: None Reported    Modes of Intervention: Support      Discipline Responsible: [id number]      Signature:  Bobbi Wilkins

## 2021-05-04 NOTE — PROGRESS NOTES
Pt was laying in his bed awake and alert. pt is out in the dayroom most of the afternoon and attending groups. Pt talked about his stressors with work and trying to work on his relationship with wife. Pt appears well groomed. Pt does reports less racing thoughts and has been medication compliant. Pt denies SI,HI,AVH.

## 2021-05-04 NOTE — PROGRESS NOTES
Spiritual Support Group Note    Number of Participants in Group: 9                       Time: 15:00-15:30    Goal: Relief from isolation and loneliness             Olya Sharing             Self-understanding and gain insight              Acceptance and belonging            Recognize they are not alone                Socialization             Empowerment       Encouragement    Topic:  [] Spiritual Wellness and Self Care                  [] Hope                     [x] Connecting with Divine/Others        [] Thankfulness and Gratitude               [x]  Meaningfulness and Purpose               [] Forgiveness               [] Peace               [] Connect to [facility]      [] Other    Participation Level:   [x] Active Listener   [] Minimal   [] Monopolizing   [] Interactive   [] No Participation   []  Other:     Attention:   [x] Alert   [] Distractible   [] Drowsy   [] Poor   [] Other:    Manner:   [x] Cooperative   [] Suspicious   [] Withdrawn   [] Guarded   [] Irritable   [] Inhospitable   [] Other:     Others Comments from Group:

## 2021-05-04 NOTE — PROGRESS NOTES
Patient out on unit, social with peers. Attending groups. Pleasant and cooperative. Has a way not to answer your questions, by changing subject or adding a joke phrase. Says he feels better when he gets to talk to someone,( therapist ) but how could he find time in his busy schedule to do so.? Anxiety #3-4. Denies all.

## 2021-05-04 NOTE — PROGRESS NOTES
Liyah Connell [facility]. FOLLOW-UP NOTE       5/4/2021     Patient was seen and examined in person, Chart reviewed   Patient's case discussed with staff/team    Chief Complaint: Depression, Psychosis    Interim History:     Pt continue to remain manic with rapid pressured speech  Has been compliant with medication  Pt slept better with less racing thoughts at night  Pt has been talking with his family  Report that his behavior had created a rift in the marriage which he is trying to work out    Appetite:   [] Normal/Unchanged  [] Increased  [x] Decreased      Sleep:       [] Normal/Unchanged  [x] Fair       [] Poor              Energy:    [] Normal/Unchanged  [] Increased  [x] Decreased        SI [] Present  [x] Absent    HI  []Present  [] Absent     Aggression:  [] yes  [x] no    Patient is [] able  [x] unable to CONTRACT FOR SAFETY     PAST MEDICAL/PSYCHIATRIC HISTORY:   No past medical history on file. FAMILY/SOCIAL HISTORY:  No family history on file. Social History     Socioeconomic History    Marital status:      Spouse name: Not on file    Number of children: Not on file    Years of education: Not on file    Highest education level: Not on file   Occupational History    Not on file   Social Needs    Financial resource strain: Not on file    Food insecurity     Worry: Not on file     Inability: Not on file    Transportation needs     Medical: Not on file     Non-medical: Not on file   Tobacco Use    Smoking status: Never Smoker    Smokeless tobacco: Never Used   Substance and Sexual Activity    Alcohol use:  Yes     Alcohol/week: 5.0 standard drinks     Types: 5 Cans of beer per week    Drug use: No    Sexual activity: Not on file   Lifestyle    Physical activity     Days per week: Not on file     Minutes per session: Not on file    Stress: Not on file   Relationships    Social connections     Talks on phone: Not on file     Gets together: Not on file Attends Islam service: Not on file     Active member of club or organization: Not on file     Attends meetings of clubs or organizations: Not on file     Relationship status: Not on file    Intimate partner violence     Fear of current or ex partner: Not on file     Emotionally abused: Not on file     Physically abused: Not on file     Forced sexual activity: Not on file   Other Topics Concern    Not on file   Social History Narrative    Not on file           ROS:  [x] All negative/unchanged except if checked.  Explain positive(checked items) below:  [] Constitutional  [] Eyes  [] Ear/Nose/Mouth/Throat  [] Respiratory  [] CV  [] GI  []   [] Musculoskeletal  [] Skin/Breast  [] Neurological  [] Endocrine  [] Heme/Lymph  [] Allergic/Immunologic    Explanation:     MEDICATIONS:    Current Facility-Administered Medications:     divalproex (DEPAKOTE ER) extended release tablet 500 mg, 500 mg, Oral, Daily, Crystal Goltz, MD, 500 mg at 05/04/21 1410    OLANZapine (ZYPREXA) tablet 10 mg, 10 mg, Oral, Nightly, Efra Delarosa MD, 10 mg at 05/03/21 2145    carboxymethylcellulose PF (REFRESH PLUS) 0.5 % ophthalmic solution 1 drop, 1 drop, Both Eyes, TID PRN, Aydin Baron DO, 1 drop at 05/04/21 0924    melatonin disintegrating tablet 5 mg, 5 mg, Oral, Nightly, Aydin Baron DO, 5 mg at 05/03/21 2145    acetaminophen (TYLENOL) tablet 650 mg, 650 mg, Oral, Q4H PRN, Crystal Goltz, MD    aluminum & magnesium hydroxide-simethicone (MAALOX) 200-200-20 MG/5ML suspension 30 mL, 30 mL, Oral, PRN, Crystal Goltz, MD, 30 mL at 05/02/21 1308    benztropine mesylate (COGENTIN) injection 2 mg, 2 mg, Intramuscular, BID PRN, Crystal Goltz, MD    haloperidol lactate (HALDOL) injection 5 mg, 5 mg, Intramuscular, Q4H PRN **OR** haloperidol (HALDOL) tablet 5 mg, 5 mg, Oral, Q4H PRN, Crystal Goltz, MD    hydrOXYzine (ATARAX) tablet 50 mg, 50 mg, Oral, TID PRN, Crystal Goltz, MD    magnesium hydroxide (MILK Well controlled  [] Improving  [] Worsening  [] No change      Diagnosis:   Principal Problem:    Bipolar affective disorder, mixed, severe, with psychotic behavior ([facility] [address]  Resolved Problems:    * No resolved hospital problems. *      LABS:    No results for input(s): WBC, HGB, PLT in the last 72 hours. Recent Labs     05/02/21  0527      K 4.5      CO2 25   BUN 11   CREATININE 0.70   GLUCOSE 100*     Recent Labs     05/02/21  0527   BILITOT 0.5   ALKPHOS 60   AST 20   ALT 40     Lab Results   Component Value Date    LABAMPH Neg 04/29/2021    BARBSCNU Neg 04/29/2021    LABBENZ Neg 04/29/2021    LABMETH Neg 04/29/2021    OPIATESCREENURINE Neg 04/29/2021    PHENCYCLIDINESCREENURINE Neg 04/29/2021    ETOH <10 04/29/2021     Lab Results   Component Value Date    TSH 0.871 04/29/2021     No results found for: LITHIUM  No results found for: VALPROATE, CBMZ    RISK ASSESSMENT: high risk of impulsiivty    Treatment Plan:  Reviewed current Medications with the patient. Medication as ordered  Added depakote 500 mg   Risks, benefits, side effects, drug-to-drug interactions and alternatives to treatment were discussed. Collateral information: ]  CD evaluation  Encourage patient to attend group and other milieu activities.   Discharge planning discussed with the patient and treatment team.    PSYCHOTHERAPY/COUNSELING:  [x] Therapeutic interview  [x] Supportive  [] CBT  [] Ongoing  [] Other    [x] Patient continues to need, on a daily basis, active treatment furnished directly by or requiring the supervision of inpatient psychiatric personnel      Anticipated Length of stay:            Electronically signed by Jenn Quiroz MD on 5/4/2021 at 3:39 PM

## 2021-05-04 NOTE — GROUP NOTE
Group Therapy Note    Date: 5/4/2021    Group Start Time: 1310  Group End Time: 1400  Group Topic: Healthy Living/Wellness    [facility]    TE Russell        Group Therapy Note    Attendees: 7         Patient's Goal:  To identify things in life you can control as well as issues you have no control over. Notes:  Shares about family and issues with peers.     Status After Intervention:  Improved    Participation Level: Interactive    Participation Quality: Appropriate, Attentive, Sharing and Supportive      Speech:  normal      Thought Process/Content: Logical      Affective Functioning: Congruent      Mood: anxious and depressed      Level of consciousness:  Alert and Attentive      Response to Learning: Able to verbalize current knowledge/experience, Able to verbalize/acknowledge new learning and Able to retain information      Endings: None Reported    Modes of Intervention: Education, Support, Socialization and Exploration      Discipline Responsible: Registered Nurse      Signature:  TE Russell

## 2021-05-05 PROCEDURE — 6370000000 HC RX 637 (ALT 250 FOR IP): Performed by: PSYCHIATRY & NEUROLOGY

## 2021-05-05 PROCEDURE — 99232 SBSQ HOSP IP/OBS MODERATE 35: CPT | Performed by: PSYCHIATRY & NEUROLOGY

## 2021-05-05 PROCEDURE — 6370000000 HC RX 637 (ALT 250 FOR IP): Performed by: INTERNAL MEDICINE

## 2021-05-05 PROCEDURE — 1240000000 HC EMOTIONAL WELLNESS R&B

## 2021-05-05 RX ADMIN — Medication 5 MG: at 21:03

## 2021-05-05 RX ADMIN — FOLIC ACID 1 MG: 1 TABLET ORAL at 08:44

## 2021-05-05 RX ADMIN — THERA TABS 1 TABLET: TAB at 08:44

## 2021-05-05 RX ADMIN — DIVALPROEX SODIUM 500 MG: 500 TABLET, EXTENDED RELEASE ORAL at 08:44

## 2021-05-05 RX ADMIN — Medication 100 MG: at 08:44

## 2021-05-05 RX ADMIN — OLANZAPINE 10 MG: 10 TABLET, FILM COATED ORAL at 21:03

## 2021-05-05 NOTE — PROGRESS NOTES
Pt denies all, no SI/HI or AVH. Pt less manic this day, under control. Patient polite and cooperative with care. Pt does state that he is reading about Depakote. Patient is educated about depakote as a mood stabilizer. Patient attends groups. Patient sleep and appetite are good. Pt has a flat affect, but is reactive on talking with patient. Pt walks around the halls a bit and takes breaks in room.

## 2021-05-05 NOTE — GROUP NOTE
Group Therapy Note    Date: 5/4/2021    Group Start Time: 1635  Group End Time: 1705  Group Topic: Healthy Living/Wellness    [facility] 3[facility] I    Merary Alcantar        Group Therapy Note    Attendees: 5/19       Patient's Goal:  To participate in group discussion about past experiences where positive coping skills were utilized and/or where other coping skills could have been used. Notes:  Pt participated in the Healthy Living Group. Status After Intervention:  Improved    Participation Level:  Active Listener and Interactive    Participation Quality: Appropriate, Attentive and Sharing      Speech:  normal      Thought Process/Content: Logical      Affective Functioning: Congruent      Mood: euthymic      Level of consciousness:  Alert and Attentive      Response to Learning: Able to verbalize current knowledge/experience, Capable of insight and Progressing to goal      Endings: None Reported    Modes of Intervention: Education      Discipline Responsible: [address], [employer] [facility] [employer]      Signature:  Merary Alcantar

## 2021-05-05 NOTE — CARE COORDINATION
Spoke with pt's wife, Zachary Monroe [phone] with pt's verbal consent to release drug screen results to her. Pt tested negative for all testable substances. Wife reports that pt was using adderall sporadically. She is wondering if adderall use over time can cause increase in symptoms.  Electronically signed by RAMÓN Proctor on 5/5/2021 at 2:35 PM

## 2021-05-05 NOTE — GROUP NOTE
Group Therapy Note    Date: 5/5/2021    Group Start Time: 1630  Group End Time: 1700  Group Topic: Recreational    [facility] 3[facility] I    Jair Alonzo        Group Therapy Note    Attendees: 8/14         Patient's Goal:  To engage or participate in Moshe.     Notes:  Patient actively participated in group.     Status After Intervention:  Improved    Participation Level: Interactive    Participation Quality: Appropriate and Attentive      Speech:  normal      Thought Process/Content: Logical      Affective Functioning: Congruent      Mood: euthymic      Level of consciousness:  Alert and Attentive      Response to Learning: Progressing to goal      Endings: None Reported    Modes of Intervention: Activity      Discipline Responsible: [employer]      Signature:  Jair Alonzo

## 2021-05-05 NOTE — CARE COORDINATION
Pt. Denies all. Pt. Does admit to feeling paranoid and spoke about the Internet and \"pop-ups. \"  Laughs at inappropriate times and has elevated affect. FOI noted. Pt. Is grandiose. Pt. [place] about the affair and said, \"I'm trying to get past it. \"  Reports good appetite and \"ok\" sleep.

## 2021-05-05 NOTE — PROGRESS NOTES
Liyah Connell [facility] [place] [facility] FOLLOW-UP NOTE       5/5/2021     Patient was seen and examined in person, Chart reviewed   Patient's case discussed with staff/team    Chief Complaint: Depression, Psychosis    Interim History:     Pt appeared less manic and grandiose  Want to continue treatment as OP  Prefer to see doctor through telepsych  Pt slept better last night  No active SI or HI  Has been talking with his family  Appropriate behavior with his peers      Appetite:   [x] Normal/Unchanged  [] Increased  [] Decreased      Sleep:       [] Normal/Unchanged  [x] Fair       [] Poor              Energy:    [x] Normal/Unchanged  [] Increased  [] Decreased        SI [] Present  [x] Absent    HI  []Present  [] Absent     Aggression:  [] yes  [x] no    Patient is [] able  [x] unable to CONTRACT FOR SAFETY     PAST MEDICAL/PSYCHIATRIC HISTORY:   No past medical history on file. FAMILY/SOCIAL HISTORY:  No family history on file. Social History     Socioeconomic History    Marital status:      Spouse name: Not on file    Number of children: Not on file    Years of education: Not on file    Highest education level: Not on file   Occupational History    Not on file   Social Needs    Financial resource strain: Not on file    Food insecurity     Worry: Not on file     Inability: Not on file    Transportation needs     Medical: Not on file     Non-medical: Not on file   Tobacco Use    Smoking status: Never Smoker    Smokeless tobacco: Never Used   Substance and Sexual Activity    Alcohol use:  Yes     Alcohol/week: 5.0 standard drinks     Types: 5 Cans of beer per week    Drug use: No    Sexual activity: Not on file   Lifestyle    Physical activity     Days per week: Not on file     Minutes per session: Not on file    Stress: Not on file   Relationships    Social connections     Talks on phone: Not on file     Gets together: Not on file     Attends Jew service: Not on file Active member of club or organization: Not on file     Attends meetings of clubs or organizations: Not on file     Relationship status: Not on file    Intimate partner violence     Fear of current or ex partner: Not on file     Emotionally abused: Not on file     Physically abused: Not on file     Forced sexual activity: Not on file   Other Topics Concern    Not on file   Social History Narrative    Not on file           ROS:  [x] All negative/unchanged except if checked.  Explain positive(checked items) below:  [] Constitutional  [] Eyes  [] Ear/Nose/Mouth/Throat  [] Respiratory  [] CV  [] GI  []   [] Musculoskeletal  [] Skin/Breast  [] Neurological  [] Endocrine  [] Heme/Lymph  [] Allergic/Immunologic    Explanation:     MEDICATIONS:    Current Facility-Administered Medications:     divalproex (DEPAKOTE ER) extended release tablet 500 mg, 500 mg, Oral, Daily, Ashkan Guillen MD, 500 mg at 05/05/21 0844    OLANZapine (ZYPREXA) tablet 10 mg, 10 mg, Oral, Nightly, Edgar Leach MD, 10 mg at 05/04/21 2147    carboxymethylcellulose PF (REFRESH PLUS) 0.5 % ophthalmic solution 1 drop, 1 drop, Both Eyes, TID PRN, [address], DO, 1 drop at 05/04/21 0924    melatonin disintegrating tablet 5 mg, 5 mg, Oral, Nightly, [address], DO, 5 mg at 05/04/21 2147    acetaminophen (TYLENOL) tablet 650 mg, 650 mg, Oral, Q4H PRN, Ashkan Guillen MD    aluminum & magnesium hydroxide-simethicone (MAALOX) 200-200-20 MG/5ML suspension 30 mL, 30 mL, Oral, PRN, Ashkan Guillen MD, 30 mL at 05/02/21 1308    benztropine mesylate (COGENTIN) injection 2 mg, 2 mg, Intramuscular, BID PRN, Ashkan Guillen MD    haloperidol lactate (HALDOL) injection 5 mg, 5 mg, Intramuscular, Q4H PRN **OR** haloperidol (HALDOL) tablet 5 mg, 5 mg, Oral, Q4H PRN, Ashkan Guillen MD    hydrOXYzine (ATARAX) tablet 50 mg, 50 mg, Oral, TID PRN, Ashkan Guillen MD    magnesium hydroxide (MILK OF MAGNESIA) 400 MG/5ML suspension 30 mL, 30 mL, Oral, Daily PRN, Gerry Leo MD    traZODone (DESYREL) tablet 50 mg, 50 mg, Oral, Nightly PRN, Gerry Leo MD, 50 mg at 05/02/21 2106    [DISCONTINUED] acetaminophen (TYLENOL) tablet 650 mg, 650 mg, Oral, Q6H PRN **OR** acetaminophen (TYLENOL) suppository 650 mg, 650 mg, Rectal, Q6H PRN, Mandeep Barr, DO    folic acid (FOLVITE) tablet 1 mg, 1 mg, Oral, Daily, Mandepe Barr, DO, 1 mg at 05/05/21 2426    thiamine tablet 100 mg, 100 mg, Oral, Daily, Kiarra Herring MD, 100 mg at 05/05/21 0717    multivitamin 1 tablet, 1 tablet, Oral, Daily, Kiarra Herring MD, 1 tablet at 05/05/21 6116      Examination:  /60   Pulse 74   Temp 98.1 °F (36.7 °C) (Oral)   Resp 19   Ht 6' 1\" (1.854 m)   Wt 248 lb (112.5 kg)   SpO2 98%   BMI 32.72 kg/m²   Gait - steady  Medication side effects(SE): no    Mental Status Examination:    Level of consciousness:  within normal limits   Appearance:  poor grooming and fair hygiene  Behavior/Motor:  normal  Attitude toward examiner:  playful  Speech:  Less pressured   Mood: less elated  Affect:  mood congruent  Thought processes:   flight of ideas   Thought content:  Suicidal Ideation:  denies suicidal ideation  Delusions:  Less grandiose  Perceptual Disturbance:  denies any perceptual disturbance  Cognition:  oriented to person, place, and time   Concentration better  Insight better  Judgement better     ASSESSMENT:   Patient symptoms are:  [] Well controlled  [x] Improving  [] Worsening  [] No change      Diagnosis:   Principal Problem:    Bipolar affective disorder, mixed, severe, with psychotic behavior ([facility] [address]  Resolved Problems:    * No resolved hospital problems. *      LABS:    No results for input(s): WBC, HGB, PLT in the last 72 hours. No results for input(s): NA, K, CL, CO2, BUN, CREATININE, GLUCOSE in the last 72 hours. No results for input(s): BILITOT, ALKPHOS, AST, ALT in the last 72 hours.   Lab Results   Component Value Date    LABAMPH Neg 04/29/2021    BARBSCNU Neg 04/29/2021    LABBENZ Neg 04/29/2021    LABMETH Neg 04/29/2021    OPIATESCREENURINE Neg 04/29/2021    PHENCYCLIDINESCREENURINE Neg 04/29/2021    ETOH <10 04/29/2021     Lab Results   Component Value Date    TSH 0.871 04/29/2021     No results found for: LITHIUM  No results found for: VALPROATE, CBMZ      Treatment Plan:  Reviewed current Medications with the patient. Medication as ordered  Risks, benefits, side effects, drug-to-drug interactions and alternatives to treatment were discussed. Collateral information: ]  CD evaluation  Encourage patient to attend group and other milieu activities.   Discharge planning discussed with the patient and treatment team.    PSYCHOTHERAPY/COUNSELING:  [x] Therapeutic interview  [x] Supportive  [] CBT  [] Ongoing  [] Other    [x] Patient continues to need, on a daily basis, active treatment furnished directly by or requiring the supervision of inpatient psychiatric personnel      Anticipated Length of stay:            Electronically signed by Desire Hill MD on 5/5/2021 at 5:44 PM

## 2021-05-05 NOTE — CARE COORDINATION
Writer approached pt and informed him that his wife is requesting the results of  His drug screen and informed him he tested negative for all substances within the drug screen. He gave verbal consent to provide the drug screen/ETOH result to his wife. This consent was witnessed by Charge nurse, Santo Blackmon RN.  Electronically signed by RAMÓN Aldana on 5/5/2021 at 12:35 PM

## 2021-05-05 NOTE — GROUP NOTE
Group Therapy Note    Date: 5/4/2021    Group Start Time: 2100  Group End Time: 2130  Group Topic: Wrap-Up    [facility]    Rufus Bijou        Group Therapy Note    Attendees: 5/20       Patient's Goal:  Be better than yesterday and challenge myself. Notes:  Pt reports meeting their goal for today. Pt chose to participate in a discussion about calming and distracting techniques. Status After Intervention:  Improved    Participation Level:  Active Listener and Interactive    Participation Quality: Appropriate, Attentive and Sharing      Speech:  normal      Thought Process/Content: Logical      Affective Functioning: Congruent      Mood: euthymic      Level of consciousness:  Alert and Attentive      Response to Learning: Able to verbalize current knowledge/experience, Able to verbalize/acknowledge new learning, Able to retain information, Capable of insight and Progressing to goal      Endings: None Reported    Modes of Intervention: Support      Discipline Responsible: [address], [facility] [employer]      Signature:  Rufus Lu

## 2021-05-05 NOTE — GROUP NOTE
Group Therapy Note    Date: 5/5/2021    Group Start Time: 1310  Group End Time: 1400  Group Topic: Cognitive Skills    [facility]    NAHED Turcios        Group Therapy Note    Attendees: 6         Patient's Goal:  To participate in mood management group. Notes: Patient was actively participating in the group. He seemed to understand that changing thinking from negative to positive thoughts could be helpful. Patient supported other group members' participation. Status After Intervention:  Improved    Participation Level: Active Listener    Participation Quality: Appropriate      Speech:  normal      Thought Process/Content: Logical      Affective Functioning: Congruent      Mood: elevated      Level of consciousness:  Alert      Response to Learning: Able to verbalize current knowledge/experience      Endings: None Reported    Modes of Intervention: Education      Discipline Responsible: /Counselor      Signature:   NAHED Turcios

## 2021-05-05 NOTE — GROUP NOTE
Group Therapy Note    Date: 5/5/2021    Group Start Time: 8337  Group End Time: 0723  Group Topic: Healthy Living/Wellness    [facility] HERO Leary RN        Group Therapy Note    Attendees: 8/14         Patient's Goal:  Better Sleep Hygeine    Notes:  Sleep Routine    Status After Intervention:  Unchanged    Participation Level:  Active Listener    Participation Quality: Appropriate      Speech:  normal      Thought Process/Content: Logical      Affective Functioning: Congruent      Mood: euthymic      Level of consciousness:  Alert and Oriented x4      Response to Learning: Progressing to goal      Endings: None Reported    Modes of Intervention: Education      Discipline Responsible: Registered Nurse      Signature:  Gege Ayers RN

## 2021-05-05 NOTE — GROUP NOTE
Group Therapy Note    Date: 5/5/2021    Group Start Time: 1000  Group End Time: 1100  Group Topic: Psychoeducation    [facility]    Genna Nascimento, CTRS        Group Therapy Note    Attendees: 12         Patient's Goal:  \"to get better and better each day\"    Notes:  Pt. attended the 1000 skill group. Worked on project to completion. Talkative and joking with other pts. Feels better. Proud of project. Status After Intervention:  Improved    Participation Level:  Active Listener and Interactive    Participation Quality: Appropriate, Attentive and Sharing      Speech:  normal      Thought Process/Content: Logical      Affective Functioning: Congruent      Mood: happy, relaxed      Level of consciousness:  Alert, Oriented x4 and Attentive      Response to Learning: Progressing to goal      Endings: None Reported    Modes of Intervention: Education, Support, Socialization and Activity      Discipline Responsible: Psychoeducational Specialist      Signature:  Noemi Warner

## 2021-05-06 PROCEDURE — 90833 PSYTX W PT W E/M 30 MIN: CPT | Performed by: PSYCHIATRY & NEUROLOGY

## 2021-05-06 PROCEDURE — 99232 SBSQ HOSP IP/OBS MODERATE 35: CPT | Performed by: PSYCHIATRY & NEUROLOGY

## 2021-05-06 PROCEDURE — 1240000000 HC EMOTIONAL WELLNESS R&B

## 2021-05-06 PROCEDURE — 6370000000 HC RX 637 (ALT 250 FOR IP): Performed by: PSYCHIATRY & NEUROLOGY

## 2021-05-06 PROCEDURE — 6370000000 HC RX 637 (ALT 250 FOR IP): Performed by: INTERNAL MEDICINE

## 2021-05-06 RX ADMIN — DIVALPROEX SODIUM 500 MG: 500 TABLET, EXTENDED RELEASE ORAL at 08:32

## 2021-05-06 RX ADMIN — FOLIC ACID 1 MG: 1 TABLET ORAL at 08:33

## 2021-05-06 RX ADMIN — OLANZAPINE 10 MG: 10 TABLET, FILM COATED ORAL at 21:25

## 2021-05-06 RX ADMIN — CARBOXYMETHYLCELLULOSE SODIUM 1 DROP: 0.5 SOLUTION/ DROPS OPHTHALMIC at 09:28

## 2021-05-06 RX ADMIN — Medication 100 MG: at 08:32

## 2021-05-06 RX ADMIN — THERA TABS 1 TABLET: TAB at 08:32

## 2021-05-06 RX ADMIN — Medication 5 MG: at 21:25

## 2021-05-06 ASSESSMENT — PAIN SCALES - GENERAL: PAINLEVEL_OUTOF10: 0

## 2021-05-06 NOTE — GROUP NOTE
Group Therapy Note    Date: 5/6/2021    Group Start Time: 0200  Group End Time: 0235  Group Topic: Cognitive Skills    [facility]    RAMÓN Velasquez        Group Therapy Note    Attendees: 7         Patient's Goal:  Identify emotions they express and emotions they hide from others    Notes: Attempted to provide wisdom to younger peer     Status After Intervention:  Unchanged    Participation Level:  Active Listener and Interactive    Participation Quality: Appropriate, Attentive, Sharing and Supportive      Speech:  loud      Thought Process/Content: Logical      Affective Functioning: Exaggerated      Mood: elevated      Level of consciousness:  Alert      Response to Learning: Progressing to goal      Endings: None Reported    Modes of Intervention: Education      Discipline Responsible: /Counselor      Signature:  RAMÓN Velasquez

## 2021-05-06 NOTE — PROGRESS NOTES
Attends Congregational service: Not on file     Active member of club or organization: Not on file     Attends meetings of clubs or organizations: Not on file     Relationship status: Not on file    Intimate partner violence     Fear of current or ex partner: Not on file     Emotionally abused: Not on file     Physically abused: Not on file     Forced sexual activity: Not on file   Other Topics Concern    Not on file   Social History Narrative    Not on file           ROS:  [x] All negative/unchanged except if checked.  Explain positive(checked items) below:  [] Constitutional  [] Eyes  [] Ear/Nose/Mouth/Throat  [] Respiratory  [] CV  [] GI  []   [] Musculoskeletal  [] Skin/Breast  [] Neurological  [] Endocrine  [] Heme/Lymph  [] Allergic/Immunologic    Explanation:     MEDICATIONS:    Current Facility-Administered Medications:     divalproex (DEPAKOTE ER) extended release tablet 500 mg, 500 mg, Oral, Daily, Gaye Baumgarten, MD, 500 mg at 05/06/21 0832    OLANZapine (ZYPREXA) tablet 10 mg, 10 mg, Oral, Nightly, Donis Barrow MD, 10 mg at 05/05/21 2103    carboxymethylcellulose PF (REFRESH PLUS) 0.5 % ophthalmic solution 1 drop, 1 drop, Both Eyes, TID PRN, Stacey Boast, DO, 1 drop at 05/06/21 7145    melatonin disintegrating tablet 5 mg, 5 mg, Oral, Nightly, Stacey Boast, DO, 5 mg at 05/05/21 2103    acetaminophen (TYLENOL) tablet 650 mg, 650 mg, Oral, Q4H PRN, Gaye Baumgarten, MD    aluminum & magnesium hydroxide-simethicone (MAALOX) 200-200-20 MG/5ML suspension 30 mL, 30 mL, Oral, PRN, Gaye Baumgarten, MD, 30 mL at 05/02/21 1308    benztropine mesylate (COGENTIN) injection 2 mg, 2 mg, Intramuscular, BID PRN, Gaye Baumgarten, MD    haloperidol lactate (HALDOL) injection 5 mg, 5 mg, Intramuscular, Q4H PRN **OR** haloperidol (HALDOL) tablet 5 mg, 5 mg, Oral, Q4H PRN, Gaye Baumgarten, MD    hydrOXYzine (ATARAX) tablet 50 mg, 50 mg, Oral, TID PRN, Gaye Baumgarten, MD    magnesium hydroxide (MILK OF MAGNESIA) 400 MG/5ML suspension 30 mL, 30 mL, Oral, Daily PRN, Emiliano Aldana MD    traZODone (DESYREL) tablet 50 mg, 50 mg, Oral, Nightly PRN, Emiliano Aldana MD, 50 mg at 05/02/21 2106    [DISCONTINUED] acetaminophen (TYLENOL) tablet 650 mg, 650 mg, Oral, Q6H PRN **OR** acetaminophen (TYLENOL) suppository 650 mg, 650 mg, Rectal, Q6H PRN, Pitney Rose, DO    folic acid (FOLVITE) tablet 1 mg, 1 mg, Oral, Daily, Pitney Rose, DO, 1 mg at 05/06/21 1518    thiamine tablet 100 mg, 100 mg, Oral, Daily, Kimberly Denton MD, 100 mg at 05/06/21 5160    multivitamin 1 tablet, 1 tablet, Oral, Daily, Kimberly Denton MD, 1 tablet at 05/06/21 8615      Examination:  /89   Pulse 80   Temp 98.1 °F (36.7 °C) (Oral)   Resp 18   Ht 6' 1\" (1.854 m)   Wt 248 lb (112.5 kg)   SpO2 97%   BMI 32.72 kg/m²   Gait - steady  Medication side effects(SE): no    Mental Status Examination:    Level of consciousness:  within normal limits   Appearance:  poor grooming and fair hygiene  Behavior/Motor:  normal  Attitude toward examiner:  playful  Speech:  Less pressured   Mood: less elated  Affect:  mood congruent  Thought processes:   flight of ideas   Thought content:  Suicidal Ideation:  denies suicidal ideation  Delusions:  Less grandiose  Perceptual Disturbance:  denies any perceptual disturbance  Cognition:  oriented to person, place, and time   Concentration better  Insight better  Judgement better     ASSESSMENT:   Patient symptoms are:  [] Well controlled  [x] Improving  [] Worsening  [] No change      Diagnosis:   Principal Problem:    Bipolar affective disorder, mixed, severe, with psychotic behavior ([facility] [address]  Resolved Problems:    * No resolved hospital problems. *      LABS:    No results for input(s): WBC, HGB, PLT in the last 72 hours. No results for input(s): NA, K, CL, CO2, BUN, CREATININE, GLUCOSE in the last 72 hours. No results for input(s): BILITOT, ALKPHOS, AST, ALT in the last 72 hours.   Lab Results   Component Value Date    LABAMPH Neg 04/29/2021    BARBSCNU Neg 04/29/2021    LABBENZ Neg 04/29/2021    LABMETH Neg 04/29/2021    OPIATESCREENURINE Neg 04/29/2021    PHENCYCLIDINESCREENURINE Neg 04/29/2021    ETOH <10 04/29/2021     Lab Results   Component Value Date    TSH 0.871 04/29/2021     No results found for: LITHIUM  No results found for: VALPROATE, CBMZ      Treatment Plan:  Reviewed current Medications with the patient. Medication as ordered  Depakote level tomorrow  Risks, benefits, side effects, drug-to-drug interactions and alternatives to treatment were discussed. Collateral information: ]  CD evaluation  Encourage patient to attend group and other milieu activities. Discharge planning discussed with the patient and treatment team.    PSYCHOTHERAPY/COUNSELING:  [x] Therapeutic interview  [x] Supportive  [] CBT  [] Ongoing  [] Other  Patient was seen 1:1 for 20 minutes, other than E&M time spent, focusing on      - coping skills techniques     - Anxiety management techniques discussed including deep breathing exercise and PMR     - discussing patients strength and weakness      - Motivational interviewing to assess the stage of change and assessing patient readiness to quit substance use.      - Focusing on negative cognition and maladaptive thoughts, which is feeding and maintaining the depression symptoms      [x] Patient continues to need, on a daily basis, active treatment furnished directly by or requiring the supervision of inpatient psychiatric personnel      Anticipated Length of stay: tomorrow            Electronically signed by Milad Sultana MD on 5/6/2021 at 3:54 PM

## 2021-05-06 NOTE — GROUP NOTE
Group Therapy Note    Date: 5/6/2021    Group Start Time: 1000  Group End Time: 1100  Group Topic: Psychoeducation    [facility]    Derrick Avery, CTRS        Group Therapy Note    Attendees: 11         Patient's Goal:  \"to be better today than yesterday\"    Notes:  Pt. attended the 1000 skill group. Jovial and engaged in the group discussion. Finished project and added the finishing touches. Encouraging to other pts. Status After Intervention:  Improved    Participation Level:  Active Listener and Interactive    Participation Quality: Appropriate, Attentive and Sharing      Speech:  normal      Thought Process/Content: Logical      Affective Functioning: Congruent      Mood: happy, calm      Level of consciousness:  Alert, Oriented x4 and Attentive      Response to Learning: Progressing to goal      Endings: None Reported    Modes of Intervention: Education, Support, Socialization and Activity      Discipline Responsible: Psychoeducational Specialist      Signature:  Lisa Patel

## 2021-05-06 NOTE — GROUP NOTE
Group Therapy Note    Date: 5/5/2021    Group Start Time: 2050  Group End Time: 2105  Group Topic: Healthy Living/Wellness    [facility]    Nelda Sevilla        Group Therapy Note    Attendees: 10/14         Patient's Goal:  To learn the benefits of and participate in a guided meditation. Notes:  Patient actively participated in group.     Status After Intervention:  Improved    Participation Level: Interactive    Participation Quality: Appropriate and Attentive      Speech:  normal      Thought Process/Content: Logical      Affective Functioning: Congruent      Mood: euthymic      Level of consciousness:  Alert and Attentive      Response to Learning: Progressing to goal      Endings: None Reported    Modes of Intervention: Education      Discipline Responsible: [address], [employer]      Signature:  Nelda Sevilla

## 2021-05-06 NOTE — FLOWSHEET NOTE
Pt social with peers on unit. Pt reports minimal anxiety. Looking forward to discharge. Pt reports not being depressed \"in here\", depressed having to face the outside world. Talks about telling son why he was in the hospital.  Talks about making sure his body is ok. Mentally first and then physically. Reports good sleep and appetite. Denies SI/HI/AVH.

## 2021-05-06 NOTE — GROUP NOTE
Group Therapy Note    Date: 5/4/2021    Group Start Time: 1110  Group End Time: 1200  Group Topic: Psychotherapy    [facility]O Gutierres, [facility]        Group Therapy Note    Attendees: 9         Patient's Goal:  To be linked with [facility]    Notes:  Patient stated he is staying as long as the doctor wants him to    Status After Intervention:  Improved    Participation Level: Interactive    Participation Quality: Appropriate      Speech:  normal      Thought Process/Content: Logical      Affective Functioning: Congruent      Mood: anxious      Level of consciousness:  Alert      Response to Learning: Progressing to goal      Endings: None Reported    Modes of Intervention: Support      Discipline Responsible: /Counselor      Signature:  Petra Gutierres, [facility]

## 2021-05-06 NOTE — FLOWSHEET NOTE
Pt and nurse walking hallway. Pt social with other peers. Pt looking forward to discharge. FOI continues. Talks about son having people over Saturday and their friends. Talks about wife making dinner at 8:30p after she has gone out for a few beers with friends. Talks about wanting to make an impact on people here and what more he can do when he is discharged. Talks about many \"hats\" he wears and how he has to worry about himself. Talks about anxiety and depression always being there d/t the hats and the infidelity he has to face with his wife. He has to build the blocks and see where it goes. Reports appetite and sleep good. Denies SI/HI/AVH.

## 2021-05-06 NOTE — PROGRESS NOTES
Pt. attended the 0900 community meeting.  Electronically signed by Katja Hernandez on 5/6/2021 at 9:42 AM

## 2021-05-06 NOTE — GROUP NOTE
Group Therapy Note    Date: 5/6/2021    Group Start Time: 0959  Group End Time: 8579  Group Topic: Recreational    [facility]    Davin Allen        Group Therapy Note    Attendees: 7/12         Patient's Goal:  To engage with or play [address] with the group. Notes:  Patient actively participated in game with peers.     Status After Intervention:  Improved    Participation Level: Interactive    Participation Quality: Appropriate and Attentive      Speech:  normal      Thought Process/Content: Logical      Affective Functioning: Congruent      Mood: euthymic      Level of consciousness:  Alert and Attentive      Response to Learning: Progressing to goal      Endings: None Reported    Modes of Intervention: Activity      Discipline Responsible: [place]      Signature:  Davin Allen

## 2021-05-06 NOTE — GROUP NOTE
Group Therapy Note    Date: 5/6/2021    Group Start Time: 6692  Group End Time: 2592  Group Topic: Healthy Living/Wellness    [facility]    Aroldo Rojas RN        Group Therapy Note    Attendees: 9/13         Patient's Goal:  To identify coping skills. Notes: Attentive and appropriate. Status After Intervention:  Improved    Participation Level:  Active Listener and Interactive    Participation Quality: Appropriate, Attentive, Sharing and Supportive      Speech:  normal      Thought Process/Content: Logical      Affective Functioning: Congruent      Mood: euthymic      Level of consciousness:  Alert, Oriented x4 and Attentive      Response to Learning: Progressing to goal      Endings: None Reported    Modes of Intervention: Education, Support, Socialization and Exploration      Discipline Responsible: Registered Nurse      Signature:  Aroldo Rojas RN

## 2021-05-06 NOTE — GROUP NOTE
Group Therapy Note    Date: 5/5/2021    Group Start Time: 2105  Group End Time: 2120  Group Topic: Wrap-Up    [facility]    Alicia Rangel        Group Therapy Note    Attendees: 11/14         Patient's Goal:  \"to improve myself daily\"    Notes:  Patient reported meeting their goal for the day.     Status After Intervention:  Unchanged    Participation Level: Interactive    Participation Quality: Appropriate and Attentive      Speech:  normal      Thought Process/Content: Logical      Affective Functioning: Congruent      Mood: euthymic      Level of consciousness:  Alert and Attentive      Response to Learning: Progressing to goal      Endings: None Reported    Modes of Intervention: Support      Discipline Responsible: [employer]      Signature:  Alicia Rangel

## 2021-05-06 NOTE — CARE COORDINATION
Police did not confiscated a weapon or firearm, reports he did not have a firearm when he went to the bank. Agreeable to discharge tomorrow. Reports purchasing a safe in order to lock medications away and purchased a pill minder. Reports she is able to pick him up tomorrow. Pt will contact her tomorrow with discharge. Provided unit with FMLA paperwork and STD paperwork. Explained we can fill out FMLA and unsure about STD.  Electronically signed by RAMÓN Catherine on 5/6/2021 at 1:53 PM

## 2021-05-06 NOTE — GROUP NOTE
Group Therapy Note    Date: 5/6/2021    Group Start Time: 3690  Group End Time: 7532  Group Topic: Healthy Living/Wellness    [facility]        Group Therapy Note    Attendees: 9/12         Patient's Goal:  To learn about self esteem and why it's important. Notes:  Patient participated in group discussion.     Status After Intervention:  Improved    Participation Level: Interactive    Participation Quality: Appropriate, Attentive and Sharing      Speech:  normal      Thought Process/Content: Logical      Affective Functioning: Congruent      Mood: euthymic      Level of consciousness:  Alert and Attentive      Response to Learning: Able to verbalize current knowledge/experience      Endings: None Reported    Modes of Intervention: Education      Discipline Responsible: [address] [employer]      Signature:  Jatinder Fu

## 2021-05-06 NOTE — GROUP NOTE
Group Therapy Note    Date: 5/6/2021    Group Start Time: 1100  Group End Time: 1150  Group Topic: Psychotherapy    [facility]    Meghna Dobson, [facility]        Group Therapy Note    Attendees:9         Patient's Goal:  To be discharged tomorrow    Notes:  Patient has a good plan for support    Status After Intervention:  Improved    Participation Level: Interactive    Participation Quality: Appropriate      Speech:  normal      Thought Process/Content: Logical      Affective Functioning: Congruent      Mood: anxious      Level of consciousness:  Alert      Response to Learning: Progressing to goal      Endings: None Reported    Modes of Intervention: Support      Discipline Responsible: /Counselor      Signature:  Cydney Gurrola

## 2021-05-07 VITALS
HEART RATE: 78 BPM | OXYGEN SATURATION: 96 % | BODY MASS INDEX: 32.87 KG/M2 | SYSTOLIC BLOOD PRESSURE: 132 MMHG | WEIGHT: 248 LBS | TEMPERATURE: 97.7 F | RESPIRATION RATE: 20 BRPM | HEIGHT: 73 IN | DIASTOLIC BLOOD PRESSURE: 92 MMHG

## 2021-05-07 LAB — VALPROIC ACID LEVEL: 23.5 UG/ML (ref 50–100)

## 2021-05-07 PROCEDURE — 99239 HOSP IP/OBS DSCHRG MGMT >30: CPT | Performed by: PSYCHIATRY & NEUROLOGY

## 2021-05-07 PROCEDURE — 6370000000 HC RX 637 (ALT 250 FOR IP): Performed by: PSYCHIATRY & NEUROLOGY

## 2021-05-07 PROCEDURE — 36415 COLL VENOUS BLD VENIPUNCTURE: CPT

## 2021-05-07 PROCEDURE — 80164 ASSAY DIPROPYLACETIC ACD TOT: CPT

## 2021-05-07 PROCEDURE — 6370000000 HC RX 637 (ALT 250 FOR IP): Performed by: INTERNAL MEDICINE

## 2021-05-07 RX ORDER — DIVALPROEX SODIUM 500 MG/1
1000 TABLET, EXTENDED RELEASE ORAL DAILY
Qty: 30 TABLET | Refills: 3 | Status: SHIPPED | OUTPATIENT
Start: 2021-05-08

## 2021-05-07 RX ORDER — MECOBALAMIN 5000 MCG
5 TABLET,DISINTEGRATING ORAL NIGHTLY
COMMUNITY
Start: 2021-05-07

## 2021-05-07 RX ORDER — OLANZAPINE 10 MG/1
10 TABLET ORAL NIGHTLY
Qty: 30 TABLET | Refills: 1 | Status: SHIPPED | OUTPATIENT
Start: 2021-05-07

## 2021-05-07 RX ADMIN — THERA TABS 1 TABLET: TAB at 08:29

## 2021-05-07 RX ADMIN — Medication 100 MG: at 08:29

## 2021-05-07 RX ADMIN — FOLIC ACID 1 MG: 1 TABLET ORAL at 08:29

## 2021-05-07 RX ADMIN — DIVALPROEX SODIUM 500 MG: 500 TABLET, EXTENDED RELEASE ORAL at 08:29

## 2021-05-07 NOTE — GROUP NOTE
Group Therapy Note    Date: 5/7/2021    Group Start Time: 8219  Group End Time: 3619  Group Topic: Healthy Living/Wellness    [facility]    Otis Almazan RN        Group Therapy Note    Attendees: 6/12         Patient's Goal:  Discuss resentment, how it affects us, and ways to heal.    Notes: Attentive and appropriate. Status After Intervention:  Improved    Participation Level:  Active Listener and Interactive    Participation Quality: Appropriate, Attentive, Sharing and Supportive      Speech:  normal      Thought Process/Content: Logical      Affective Functioning: Congruent      Mood: euthymic      Level of consciousness:  Alert, Oriented x4 and Attentive      Response to Learning: Progressing to goal      Endings: None Reported    Modes of Intervention: Education, Support and Socialization      Discipline Responsible: Registered Nurse      Signature:  Otis Almazan RN

## 2021-05-07 NOTE — CARE COORDINATION
Completed FMLA paperwork. Provided pt with original copy and kept copy for pt chart. Update wife on discharge. She is requesting AVS to be sent to his PCP for his follow up appointment.  Wife will  pt at 2pm. Electronically signed by RAMÓN Woods on 5/7/2021 at 11:01 AM

## 2021-05-07 NOTE — DISCHARGE SUMMARY
DISCHARGE SUMMARY      Patient ID:  Bijal Juarez  [id number]  57 y.o.  1965      Admit date: 5/1/2021    Discharge date and time: 5/7/2021    Admitting Physician: Patricia Brown MD     Discharge Physician: Dr Belinda Arvizu MD    Admission Diagnoses: Substance induced mood disorder [facility]) [F19.94]    Admission Condition: poor    Discharged Condition: stable    Admission Circumstance:     Mr. Bijal Juarez is a 50 y.o. male with unclear history of mental health problems (he initially denied mental health history, but then said he had attempted suicide once by overdose on Aspirin when he was 21-24 yo), because he 'missed his mother and father' when he was in [place]) who was brought to the ER by EMS after he reportedly had walked into the Police station with a gun stating he wanted to end his life. The EMS had initially reported that he had walked into a bank with a gun, but when a call was place to verify the information, it was reported he had actually walked into the Police station with the gun. While in the ER, the patient gave confusing information, and would not answer questions appropriately; at some point one note mentions though, \"Patient is Aox4. The patient states he does not rember the details of what happened. He states he has been overwhelmed at work. He claims he is in fear of losing his job because he thinks [employer] [employer] [facility] [facility] coworker is trying to get him fired. The patient states he is not feeling suicidal at all. He says he remembers feeling very overwhelmed he woke up for work this morning. He then goes on to day he was getting ready for work when it [employer] like a movie started playing in my head on a loop, every ten minutes. It was like I walked around all morning like a zombie because all I could see was that movie playing. \" The patient claims this has never happened to him. \"  He was initially admitted to a medical floor to r/o any medical illness as a possible cause of delirium, and was seen by Dr. Belinda Arvizu there and he reported having been feeling depressed; he was medically cleared by both Internal Medicine and Neurology and transferred to .     HISTORY OF PRESENT ILLNESS:       The patient is a 50 y.o. male with unclear past history of mental illness (it is unclear whether he had actually attempted suicide at the age of 21-26 yo, but he did report that though after initially denying mental health problems), who was admitted to the hospital for the above mentioned reasons. When interviewed today, the patient was quite disorganized in his discourse; he tended to be circumstantial and tangential, and at times with loose associations. He said, when asked what had led to his admission, that he is \"doing fine\" but was admitted because of \"taking out too many jobs, and not taking care of himself\". He said he remembers [place] lot\" about his admission; he said he Jaime Parryt been overworked, overextended himself, had sleep deprivation, took a day off work, and went and did all kinds of things\", and then talked about \"the paranoia of my wireless router, [employer]\". He said he \"went to get help\" at the neighbor's house who \"got a camera\". He then talked about the neighbor's dog. He talked about his wife, who, he said, works at [employer], and said he \"went to get more help\" at [employer]", \"because of COVID\", and went next door and told them he needed help, and gave them his (?bank) card, and told \"her\" he needs help, and she \"grabbed the phone\". He said he \"doesn't know why he would do that behavior\", but \"the girls were a little panicked\". He said, when asked, that he doesn't remember taking a gun with him, and said he doesn't have a gun. He said he does remember, \"once I got in a safe place\", in the [facility], that he \"walked there and flagged them down, and said, 'leg me get inside and be safe\".   He said he had been under a lot of stress lately, \"trying to fix the world\"; when asked how he was doing that, he said it was through [employer] little impact I have on people\". He said his sleep was \"not good\", and it was 'up and down'. He said his appetite was \"not good\". He said he had been actually sleep deprived, and \"deprived of nourishment\". He then went off on various other tangents. He denied feeling depressed, but said that \"there are things in the family life that I'm taking care of\". He denied having suicidal or homicidal ideation. He said he 'loves his kids too much for that\". When asked about hallucinations, he talked about \" a state of delirium that I believe someone gave me a drug\". When asked whether he thought there was anyone who wanted to harm him, the said that \"there is a scenario that could exist, but I don't think so\".          Stressors: ?job related stress     The patient is not currently receiving care for the above psychiatric illness.     Medications Prior to Admission:     Prescriptions Prior to Admission   Medications Prior to Admission: Glucosamine 500 MG CAPS, Take 1 capsule by mouth daily  mometasone (ELOCON) 0.1 % cream, Apply topically twice daily. Multiple Vitamin (MULTIVITAMIN PO), Take 1 tablet by mouth daily         Compliance: n/a     Psychiatric Review of Systems       Depression: ?yes     Suzie or Hypomania:  no     Panic Attacks:  no     Phobias:  no     Obsessions and Compulsions:  no     PTSD : none reported     Hallucinations: unclear     Delusions: ?seems somewhat paranoid     Substance Abuse History:  ETOH: yes; he reported drinking 3-4 beers/day; last time was ? Wednesday or Thursday   Marijuana: unclear; he talked about his wife giving him some \"Sativa gummies\", but tox screen was negative  Opiates: no  Other Drugs: ? Adderall which he said his wife would give him once in a while to keep him awake; he said he last took it [place] a week ago, or a couple of days ago        Past Psychiatric History:  Prior Diagnosis:  unclear  Psychiatrist: no  Therapist:no  Hospitalization: no Hx of Suicidal Attempts: ?yes  Hx of violence:  no  ECT: no  Previous discontinued Psychiatric Med Trials: n/a        PAST MEDICAL/PSYCHIATRIC HISTORY:   No past medical history on file. FAMILY/SOCIAL HISTORY:  No family history on file. Social History     Socioeconomic History    Marital status:      Spouse name: Not on file    Number of children: Not on file    Years of education: Not on file    Highest education level: Not on file   Occupational History    Not on file   Social Needs    Financial resource strain: Not on file    Food insecurity     Worry: Not on file     Inability: Not on file    Transportation needs     Medical: Not on file     Non-medical: Not on file   Tobacco Use    Smoking status: Never Smoker    Smokeless tobacco: Never Used   Substance and Sexual Activity    Alcohol use:  Yes     Alcohol/week: 5.0 standard drinks     Types: 5 Cans of beer per week    Drug use: No    Sexual activity: Not on file   Lifestyle    Physical activity     Days per week: Not on file     Minutes per session: Not on file    Stress: Not on file   Relationships    Social connections     Talks on phone: Not on file     Gets together: Not on file     Attends Taoist service: Not on file     Active member of club or organization: Not on file     Attends meetings of clubs or organizations: Not on file     Relationship status: Not on file    Intimate partner violence     Fear of current or ex partner: Not on file     Emotionally abused: Not on file     Physically abused: Not on file     Forced sexual activity: Not on file   Other Topics Concern    Not on file   Social History Narrative    Not on file       MEDICATIONS:    Current Facility-Administered Medications:     divalproex (DEPAKOTE ER) extended release tablet 500 mg, 500 mg, Oral, Daily, Jim Bailey MD, 500 mg at 05/07/21 0829    OLANZapine (ZYPREXA) tablet 10 mg, 10 mg, Oral, Nightly, Laura Alexander MD, 10 mg at 05/06/21 8091   carboxymethylcellulose PF (REFRESH PLUS) 0.5 % ophthalmic solution 1 drop, 1 drop, Both Eyes, TID PRN, Pitney Rose, DO, 1 drop at 05/06/21 0928    melatonin disintegrating tablet 5 mg, 5 mg, Oral, Nightly, Pitney Rose, DO, 5 mg at 05/06/21 2125    acetaminophen (TYLENOL) tablet 650 mg, 650 mg, Oral, Q4H PRN, Anayeli Melgar MD    aluminum & magnesium hydroxide-simethicone (MAALOX) 200-200-20 MG/5ML suspension 30 mL, 30 mL, Oral, PRN, Anayeli Melgar MD, 30 mL at 05/02/21 1308    benztropine mesylate (COGENTIN) injection 2 mg, 2 mg, Intramuscular, BID PRN, Anayeli Melgar MD    haloperidol lactate (HALDOL) injection 5 mg, 5 mg, Intramuscular, Q4H PRN **OR** haloperidol (HALDOL) tablet 5 mg, 5 mg, Oral, Q4H PRN, Anayeli Melgar MD    hydrOXYzine (ATARAX) tablet 50 mg, 50 mg, Oral, TID PRN, Anayeli Melgar MD    magnesium hydroxide (MILK OF MAGNESIA) 400 MG/5ML suspension 30 mL, 30 mL, Oral, Daily PRN, Anayeli Melgar MD    traZODone (DESYREL) tablet 50 mg, 50 mg, Oral, Nightly PRN, Anayeli Melgar MD, 50 mg at 05/02/21 2106    [DISCONTINUED] acetaminophen (TYLENOL) tablet 650 mg, 650 mg, Oral, Q6H PRN **OR** acetaminophen (TYLENOL) suppository 650 mg, 650 mg, Rectal, Q6H PRN, Pitney Rose, DO    folic acid (FOLVITE) tablet 1 mg, 1 mg, Oral, Daily, Pitney Rose, DO, 1 mg at 05/07/21 7828    thiamine tablet 100 mg, 100 mg, Oral, Daily, Anne Mercado MD, 100 mg at 05/07/21 5716    multivitamin 1 tablet, 1 tablet, Oral, Daily, Anne Mercado MD, 1 tablet at 05/07/21 8189    Examination:  BP (!) 132/92   Pulse 78   Temp 97.7 °F (36.5 °C) (Oral)   Resp 20   Ht 6' 1\" (1.854 m)   Wt 248 lb (112.5 kg)   SpO2 96%   BMI 32.72 kg/m²   Gait - steady    HOSPITAL COURSE[de-identified]  Following admission to the hospital, patient had a complete physical exam and blood work up  Patient was monitored closely with suicide precaution  Patient was started on medication  Was encouraged to participate in group and other milieu activity  Patient started to feel better with this combination of treatment. Significant progress in the symptoms since admission. Mood better, with the score of 2/10 - bad  No AVH or paranoid thoughts  No Hopeless or worthless feeling  No active SI/HI  Appetite:  [x] Normal  [] Increased  [] Decreased    Sleep:       [x] Normal  [] Fair       [] Poor            Energy:    [x] Normal  [] Increased  [] Decreased     SI [] Present  [x] Absent  HI  []Present  [x] Absent   Aggression:  [] yes  [] no  Patient is [x] able  [] unable to CONTRACT FOR SAFETY   Medication side effects(SE):  [x] None(Psych. Meds.) [] Other      Mental Status Examination on discharge:    Level of consciousness:  within normal limits   Appearance:  well-appearing  Behavior/Motor:  no abnormalities noted  Attitude toward examiner:  attentive and good eye contact  Speech:  spontaneous, normal rate and normal volume   Mood: euthymic  Affect:  mood congruent  Thought processes:  goal directed   Thought content:  Suicidal Ideation:  denies suicidal ideation  Delusions:  no evidence of delusions  Perceptual Disturbance:  denies any perceptual disturbance  Cognition:  oriented to person, place, and time   Concentration intact  Memory intact  Insight good   Judgement fair   Fund of Knowledge adequate      ASSESSMENT:  Patient symptoms are:  [x] Well controlled  [x] Improving  [] Worsening  [] No change      Diagnosis:  Principal Problem:    Bipolar affective disorder, mixed, severe, with psychotic behavior ([facility]  Resolved Problems:    * No resolved hospital problems. *      LABS:    No results for input(s): WBC, HGB, PLT in the last 72 hours. No results for input(s): NA, K, CL, CO2, BUN, CREATININE, GLUCOSE in the last 72 hours. No results for input(s): BILITOT, ALKPHOS, AST, ALT in the last 72 hours.   Lab Results   Component Value Date    LABAMPH Neg 04/29/2021    BARBSCNU Neg 04/29/2021    LABBENZ Neg 04/29/2021 LABMETH Neg 04/29/2021    OPIATESCREENURINE Neg 04/29/2021    PHENCYCLIDINESCREENURINE Neg 04/29/2021    ETOH <10 04/29/2021     Lab Results   Component Value Date    TSH 0.871 04/29/2021     No results found for: LITHIUM  Lab Results   Component Value Date    VALPROATE 23.5 (L) 05/07/2021       RISK ASSESSMENT AT DISCHARGE: Low risk for suicide and homicide. Treatment Plan:  Reviewed current Medications with the patient. Education provided on the complaince with treatment. Risks, benefits, side effects, drug-to-drug interactions and alternatives to treatment were discussed. Encourage patient to attend outpatient follow up appointment and therapy. Patient was advised to call the outpatient provider, visit the nearest ED or call 911 if symptoms are not manageable. Patient's family member was contacted prior to the discharge. Medication List      START taking these medications    divalproex 500 MG extended release tablet  Commonly known as: DEPAKOTE ER  Take 2 tablets by mouth daily  Start taking on: May 8, 2021     melatonin 5 MG Tbdp disintegrating tablet  Take 1 tablet by mouth nightly     OLANZapine 10 MG tablet  Commonly known as: ZYPREXA  Take 1 tablet by mouth nightly        CONTINUE taking these medications    Glucosamine 500 MG Caps     mometasone 0.1 % cream  Commonly known as: Elocon  Apply topically twice daily.      MULTIVITAMIN PO           Where to Get Your Medications      These medications were sent to [facility], [address] [place] [address],  [address]    Phone: [phone]   · divalproex 500 MG extended release tablet  · OLANZapine 10 MG tablet     You can get these medications from any pharmacy    You don't need a prescription for these medications  · melatonin 5 MG Tbdp disintegrating tablet           Reason for more than one antipsychotic:   [x] N/A  [] 3 failed monotherapy(drugs tried):  [] Cross over to a new antipsychotic  [] Taper to monotherapy from polypharmacy  [] Augmentation of Clozapine therapy due to treatment resistance to single therapy        TIME SPEND - 35 MINUTES TO COMPLETE THE EVALUATION, DISCHARGE SUMMARY, MEDICATION RECONCILIATION AND FOLLOW UP CARE     Mayela Goyal  5/7/2021  9:27 AM

## 2021-05-07 NOTE — GROUP NOTE
Group Therapy Note    Date: 5/7/2021    Group Start Time: 1100  Group End Time: 6538  Group Topic: Psychotherapy    [facility] HERO Gutierres, [facility]        Group Therapy Note    Attendees: 11         Patient's Goal: to go home    Notes:  Patient said he is doing well    Status After Intervention:  Improved    Participation Level: Interactive    Participation Quality: Appropriate      Speech:  normal      Thought Process/Content: Logical      Affective Functioning: Congruent      Mood: anxious      Level of consciousness:  Alert      Response to Learning: Progressing to goal      Endings: None Reported    Modes of Intervention: Support      Discipline Responsible: /Counselor      Signature:  Petra Gutierres, [facility]

## 2021-05-07 NOTE — PROGRESS NOTES
Pt. attended the 0900 community meeting.  Electronically signed by Vijaya La on 5/7/2021 at 12:21 PM

## 2021-05-07 NOTE — GROUP NOTE
Group Therapy Note    Date: 5/7/2021    Group Start Time: 1000  Group End Time: 1100  Group Topic: Psychoeducation    [facility]    Jackeline Ordonez, [facility]S        Group Therapy Note    Attendees:      11    Patient's Goal:  \"to continue to feel better\"    Notes:  Pt. attended the 1000 skill group. Slightly irritated because he  He needed help completing a form for work. Reassurance given and directed to staff who could help. Returned to session calm. Status After Intervention:  Improved    Participation Level:  Active Listener and Interactive    Participation Quality: Appropriate, Attentive and Sharing      Speech:  normal      Thought Process/Content: Logical      Affective Functioning: Congruent      Mood: irritable and calm      Level of consciousness:  Alert, Oriented x4 and Attentive      Response to Learning: Progressing to goal      Endings: None Reported    Modes of Intervention: Education, Support, Socialization and Activity      Discipline Responsible: Psychoeducational Specialist      Signature:  Melida Marquez

## 2021-05-07 NOTE — GROUP NOTE
Group Therapy Note    Date: 5/6/2021    Group Start Time: 2050  Group End Time: 2105  Group Topic: Wrap-Up    [facility]    Jen Solano        Group Therapy Note    Attendees: 10/15         Patient's Goal:  \"to continue progressing\"    Notes:  Patient reported meeting their goal for the day. Patient chose to participate in a deep breathing exercise following wrap up group.     Status After Intervention:  Unchanged    Participation Level: Interactive    Participation Quality: Appropriate, Attentive and Sharing      Speech:  normal      Thought Process/Content: Logical      Affective Functioning: Congruent      Mood: euthymic      Level of consciousness:  Alert and Attentive      Response to Learning: Progressing to goal      Endings: None Reported    Modes of Intervention: Support      Discipline Responsible: [employer]      Signature:  Jen Solano

## 2021-05-13 ENCOUNTER — VIRTUAL VISIT (OUTPATIENT)
Dept: PULMONOLOGY | Age: 56
End: 2021-05-13
Payer: COMMERCIAL

## 2021-05-13 DIAGNOSIS — R91.1 LUNG NODULE: Primary | ICD-10-CM

## 2021-05-13 PROCEDURE — 99202 OFFICE O/P NEW SF 15 MIN: CPT | Performed by: NURSE PRACTITIONER

## 2021-05-13 ASSESSMENT — ENCOUNTER SYMPTOMS
ABDOMINAL DISTENTION: 0
CHEST TIGHTNESS: 0
VOMITING: 0
ABDOMINAL PAIN: 0
EYES NEGATIVE: 1
STRIDOR: 0
APNEA: 0
COUGH: 0
CHOKING: 0
NAUSEA: 0
SHORTNESS OF BREATH: 0
WHEEZING: 0

## 2021-05-13 NOTE — PROGRESS NOTES
This visit began at [address] [place] [address] [address]  Patient has been screened to determine that this visit qualifies for a \"Telephone Visit\". Patient is currently established with the current medical practice, the condition being reviewed was not addressed within the previous 7 days and is not likely be determined to need a procedure within the next 24 hours. This visit was via telephone due to the restrictions of the COVID-19 pandemic. All issues as below were discussed and addressed but no physical exam was performed. It was felt the patient should be evaluated in the clinic there will be comment below demonstrating they were directed there. The patient is aware and has given verbal consent to be billed for this telephone encounter. Chief complaint : Lung nodule    HPI   Patient was in the emergency room, on 4/30/2021 for altered mental status. Patient had CT chest lung nodule in the right lower lobe was found. Patient reports lung surgery 3 years ago involving the right side. Patient denies shortness of breath, denies chest pain, no coughing, no fever, states no lower extremity edema. Patient works for [employer] and is able to tolerate work and daily activities without any shortness of breath. PMH:    Current Outpatient Medications on File Prior to Visit   Medication Sig Dispense Refill    OLANZapine (ZYPREXA) 10 MG tablet Take 1 tablet by mouth nightly 30 tablet 1    divalproex (DEPAKOTE ER) 500 MG extended release tablet Take 2 tablets by mouth daily 30 tablet 3    melatonin 5 MG TBDP disintegrating tablet Take 1 tablet by mouth nightly      Glucosamine 500 MG CAPS Take 1 capsule by mouth daily      mometasone (ELOCON) 0.1 % cream Apply topically twice daily. 45 g 0    Multiple Vitamin (MULTIVITAMIN PO) Take 1 tablet by mouth daily        No current facility-administered medications on file prior to visit. No past medical history on file.   Past Surgical History:   Procedure Laterality Date    HERNIA REPAIR  2011    inguinal    TUMOR EXCISION  2003    Right lung Lining     Social History     Socioeconomic History    Marital status:      Spouse name: Not on file    Number of children: Not on file    Years of education: Not on file    Highest education level: Not on file   Occupational History    Not on file   Social Needs    Financial resource strain: Not on file    Food insecurity     Worry: Not on file     Inability: Not on file    Transportation needs     Medical: Not on file     Non-medical: Not on file   Tobacco Use    Smoking status: Never Smoker    Smokeless tobacco: Never Used   Substance and Sexual Activity    Alcohol use: Yes     Alcohol/week: 5.0 standard drinks     Types: 5 Cans of beer per week    Drug use: No    Sexual activity: Not on file   Lifestyle    Physical activity     Days per week: Not on file     Minutes per session: Not on file    Stress: Not on file   Relationships    Social connections     Talks on phone: Not on file     Gets together: Not on file     Attends Yarsanism service: Not on file     Active member of club or organization: Not on file     Attends meetings of clubs or organizations: Not on file     Relationship status: Not on file    Intimate partner violence     Fear of current or ex partner: Not on file     Emotionally abused: Not on file     Physically abused: Not on file     Forced sexual activity: Not on file   Other Topics Concern    Not on file   Social History Narrative    Not on file     No family history on file. Allergies:  Patient has no known allergies. Review of Systems   Constitutional: Negative for chills and fever. HENT: Negative for congestion and postnasal drip. Eyes: Negative. Respiratory: Negative for apnea, cough, choking, chest tightness, shortness of breath, wheezing and stridor. Cardiovascular: Negative for chest pain and leg swelling.    Gastrointestinal: Negative for abdominal distention, abdominal pain, nausea and vomiting. Genitourinary: Negative. Skin: Negative. Neurological: Negative for dizziness and weakness. Psychiatric/Behavioral: Negative. PHYSICAL EXAM / RESULTS    There were no vitals taken for this visit.     Vitals signs: pt does not have the proper equipment to take all VS.    The physical is not performed due to the visit being a telephone counter as indicated due to the restrictions of the COVID-19 pandemic    Recent Results (from the past 2016 hour(s))   EKG 12 Lead    Collection Time: 04/29/21 11:37 AM   Result Value Ref Range    Ventricular Rate 74 BPM    Atrial Rate 74 BPM    P-R Interval 162 ms    QRS Duration 110 ms    Q-T Interval 412 ms    QTc Calculation (Bazett) 457 ms    P Axis -1 degrees    R Axis -23 degrees    T Axis -18 degrees   Ethanol    Collection Time: 04/29/21 12:00 PM   Result Value Ref Range    Ethanol Lvl <10 mg/dL    Ethanol percent Not indicated G/dL   APTT    Collection Time: 04/29/21 12:00 PM   Result Value Ref Range    aPTT 32.9 24.4 - 36.8 sec   CBC Auto Differential    Collection Time: 04/29/21 12:00 PM   Result Value Ref Range    WBC 5.7 4.8 - 10.8 K/uL    RBC 5.13 4.70 - 6.10 M/uL    Hemoglobin 15.0 14.0 - 18.0 g/dL    Hematocrit 43.8 42.0 - 52.0 %    MCV 85.4 80.0 - 100.0 fL    MCH 29.3 27.0 - 31.3 pg    MCHC 34.3 33.0 - 37.0 %    RDW 13.7 11.5 - 14.5 %    Platelets 219 823 - 481 K/uL    Neutrophils % 80.9 %    Lymphocytes % 11.7 %    Monocytes % 6.9 %    Eosinophils % 0.1 %    Basophils % 0.4 %    Neutrophils Absolute 4.6 1.4 - 6.5 K/uL    Lymphocytes Absolute 0.7 (L) 1.0 - 4.8 K/uL    Monocytes Absolute 0.4 0.2 - 0.8 K/uL    Eosinophils Absolute 0.0 0.0 - 0.7 K/uL    Basophils Absolute 0.0 0.0 - 0.2 K/uL   High sensitivity CRP    Collection Time: 04/29/21 12:00 PM   Result Value Ref Range    CRP High Sensitivity 2.5 0.0 - 5.0 mg/L   Brain Natriuretic Peptide    Collection Time: 04/29/21 12:00 PM   Result Value Ref Range Drug Screen, Comprehensive    Collection Time: 04/29/21 12:12 PM   Result Value Ref Range    PCP Screen, Urine Neg Negative <25 ng/mL    Benzodiazepine Screen, Urine Neg Negative <150 ng/mL    Cocaine Metabolite Screen, Urine Neg Negative <150 ng/mL    Amphetamine Screen, Urine Neg Negative <500 ng/mL    Cannabinoid Scrn, Ur Neg Negative <50 ng/mL    Opiate Scrn, Ur Neg Negative <100 ng/mL    Barbiturate Screen, Ur Neg Negative <200 ng/mL    Methadone Screen, Urine Neg Negative <200 ng/mL    Propoxyphene Screen, Urine Neg Negative <300 ng/mL    UR Oxycodone Rapid Screen Neg Negative <100 ng/mL    Drug Screen Comment: see below    POCT Arterial    Collection Time: 04/29/21 12:56 PM   Result Value Ref Range    POC Sodium 139 136 - 145 mEq/L    POC Potassium 3.8 3.5 - 5.1 mEq/L    POC Chloride 104 99 - 110 mEq/L    POC Glucose 125 (H) 60 - 115 mg/dl    POC Creatinine 0.6 (L) 0.9 - 1.3 mg/dL    GFR Non-African American >60 >60    GFR African American >60 >60    Calcium, Ion 1.14 1.12 - 1.32 mmol/L    pH, Arterial 7.441 7.350 - 7.450    pCO2, Arterial 35 35 - 45 mm Hg    pO2, Arterial 62 (HH) 75 - 108 mm Hg    HCO3, Arterial 23.7 21.0 - 29.0 mmol/L    Base Excess, Arterial 0 -3 - 3    O2 Sat, Arterial 93 (HH) 93 - 100 %    TCO2, Arterial 25 22 - 29    Lactate 0.72 0.40 - 2.00 mmol/L    POC Hematocrit 43 41 - 53 %    Hemoglobin 14.5 13.5 - 17.5 gm/dL    Sample Type ART     Performed on SEE BELOW    Ammonia    Collection Time: 04/29/21  1:26 PM   Result Value Ref Range    Ammonia 40 16 - 60 umol/L   Culture, Blood 1    Collection Time: 04/29/21  1:26 PM    Specimen: Blood   Result Value Ref Range    Blood Culture, Routine No growth after 5 days of incubation. Culture, Blood 2    Collection Time: 04/29/21  1:26 PM    Specimen: Blood   Result Value Ref Range    Culture, Blood 2 No growth after 5 days of incubation.     SPECIMEN REJECTION    Collection Time: 04/29/21  2:09 PM   Result Value Ref Range    Rejected Test CPK,CMP,MG    Comprehensive Metabolic Panel    Collection Time: 04/29/21  2:41 PM   Result Value Ref Range    Sodium 139 135 - 144 mEq/L    Potassium 4.0 3.4 - 4.9 mEq/L    Chloride 105 95 - 107 mEq/L    CO2 22 20 - 31 mEq/L    Anion Gap 12 9 - 15 mEq/L    Glucose 95 70 - 99 mg/dL    BUN 7 6 - 20 mg/dL    CREATININE 0.56 (L) 0.70 - 1.20 mg/dL    GFR Non-African American >60.0 >60    GFR  >60.0 >60    Calcium 8.6 8.5 - 9.9 mg/dL    Total Protein 7.5 6.3 - 8.0 g/dL    Albumin 4.4 3.5 - 4.6 g/dL    Total Bilirubin 0.5 0.2 - 0.7 mg/dL    Alkaline Phosphatase 51 35 - 104 U/L    ALT 35 0 - 41 U/L    AST 25 0 - 40 U/L    Globulin 3.1 2.3 - 3.5 g/dL   CK    Collection Time: 04/29/21  2:41 PM   Result Value Ref Range    Total  (H) 0 - 190 U/L   Magnesium    Collection Time: 04/29/21  2:41 PM   Result Value Ref Range    Magnesium 2.3 1.7 - 2.4 mg/dL   CKMB & RELATIVE PERCENT    Collection Time: 04/29/21  2:41 PM   Result Value Ref Range    CK-MB 3.4 0.0 - 6.7 ng/mL    CK-MB Index 1.3 0.0 - 3.5 %   Comprehensive Metabolic Panel w/ Reflex to MG    Collection Time: 04/30/21  5:05 AM   Result Value Ref Range    Sodium 140 135 - 144 mEq/L    Potassium reflex Magnesium 4.0 3.4 - 4.9 mEq/L    Chloride 103 95 - 107 mEq/L    CO2 25 20 - 31 mEq/L    Anion Gap 12 9 - 15 mEq/L    Glucose 95 70 - 99 mg/dL    BUN 7 6 - 20 mg/dL    CREATININE 0.62 (L) 0.70 - 1.20 mg/dL    GFR Non-African American >60.0 >60    GFR  >60.0 >60    Calcium 8.8 8.5 - 9.9 mg/dL    Total Protein 7.2 6.3 - 8.0 g/dL    Albumin 4.2 3.5 - 4.6 g/dL    Total Bilirubin 0.7 0.2 - 0.7 mg/dL    Alkaline Phosphatase 54 35 - 104 U/L    ALT 35 0 - 41 U/L    AST 23 0 - 40 U/L    Globulin 3.0 2.3 - 3.5 g/dL   SPECIMEN REJECTION    Collection Time: 04/30/21  6:27 AM   Result Value Ref Range    Rejected Test cbcwd     Reason for Rejection see below    CBC Auto Differential    Collection Time: 04/30/21  7:06 AM   Result Value Ref Range    WBC Plan:   Return in about 6 weeks (around 6/24/2021). Patient has PET scan scheduled for [facility]. We will have him follow-up in 6 weeks. Explained to patient if PET scan is negative we will continue to monitor. If PET scan is positive we will discuss options.     This visit ended at 1250    Electronically signed by TE Alex CNP

## 2021-06-03 ENCOUNTER — HOSPITAL ENCOUNTER (OUTPATIENT)
Dept: CT IMAGING | Age: 56
Discharge: HOME | End: 2021-06-05
Payer: COMMERCIAL

## 2021-06-03 DIAGNOSIS — R91.1 LUNG NODULE: ICD-10-CM

## 2021-06-03 PROCEDURE — 3430000000 HC RX DIAGNOSTIC RADIOPHARMACEUTICAL: Performed by: INTERNAL MEDICINE

## 2021-06-03 PROCEDURE — A9552 F18 FDG: HCPCS | Performed by: INTERNAL MEDICINE

## 2021-06-03 PROCEDURE — 78815 PET IMAGE W/CT SKULL-THIGH: CPT

## 2021-06-03 RX ORDER — FLUDEOXYGLUCOSE F 18 200 MCI/ML
17.66 INJECTION, SOLUTION INTRAVENOUS
Status: COMPLETED | OUTPATIENT
Start: 2021-06-03 | End: 2021-06-03

## 2021-06-03 RX ADMIN — FLUDEOXYGLUCOSE F 18 17.66 MILLICURIE: 200 INJECTION, SOLUTION INTRAVENOUS at 09:36

## 2021-06-30 ENCOUNTER — OFFICE VISIT (OUTPATIENT)
Dept: CARDIOTHORACIC SURGERY | Age: 56
End: 2021-06-30
Payer: COMMERCIAL

## 2021-06-30 VITALS — HEART RATE: 71 BPM | WEIGHT: 260 LBS | HEIGHT: 75 IN | OXYGEN SATURATION: 96 % | BODY MASS INDEX: 32.33 KG/M2

## 2021-06-30 DIAGNOSIS — R91.1 PULMONARY NODULE: Primary | ICD-10-CM

## 2021-06-30 PROCEDURE — 1036F TOBACCO NON-USER: CPT | Performed by: THORACIC SURGERY (CARDIOTHORACIC VASCULAR SURGERY)

## 2021-06-30 PROCEDURE — 99203 OFFICE O/P NEW LOW 30 MIN: CPT | Performed by: THORACIC SURGERY (CARDIOTHORACIC VASCULAR SURGERY)

## 2021-06-30 PROCEDURE — G8417 CALC BMI ABV UP PARAM F/U: HCPCS | Performed by: THORACIC SURGERY (CARDIOTHORACIC VASCULAR SURGERY)

## 2021-06-30 PROCEDURE — 3017F COLORECTAL CA SCREEN DOC REV: CPT | Performed by: THORACIC SURGERY (CARDIOTHORACIC VASCULAR SURGERY)

## 2021-06-30 PROCEDURE — G8427 DOCREV CUR MEDS BY ELIG CLIN: HCPCS | Performed by: THORACIC SURGERY (CARDIOTHORACIC VASCULAR SURGERY)

## 2021-06-30 ASSESSMENT — ENCOUNTER SYMPTOMS
SHORTNESS OF BREATH: 0
SORE THROAT: 0
VOICE CHANGE: 0
NAUSEA: 0
DIARRHEA: 0
CHOKING: 0
VOMITING: 0
ABDOMINAL DISTENTION: 0
WHEEZING: 0
CHEST TIGHTNESS: 0
STRIDOR: 0
COUGH: 0
ABDOMINAL PAIN: 0
TROUBLE SWALLOWING: 0

## 2021-06-30 NOTE — PROGRESS NOTES
Subjective:      Patient ID: Will Grant is a 50 y.o. male who presents today for: Pulmonary nodule  Chief Complaint   Patient presents with    Pulmonary Nodule       HPI  Patient had some severe recent stresses in his life which led to a mental breakdown as he puts it. He was taken to the hospital and was scanned. As an incidental finding on a chest CAT scan a pleural-based nodule was found in the right chest.    Patient has recovered from his problems. He is back to his usual state of health. He denies any significant shortness of breath with activity. He denies any chest pains cough or hemoptysis. 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 Because the tumor was benign he says he has not had any follow-up CAT scans to compare. No past medical history on file. Past Surgical History:   Procedure Laterality Date    HERNIA REPAIR  2011    inguinal    TUMOR EXCISION  2003    Right lung Lining     Social History     Socioeconomic History    Marital status:      Spouse name: Not on file    Number of children: Not on file    Years of education: Not on file    Highest education level: Not on file   Occupational History    Not on file   Tobacco Use    Smoking status: Never Smoker    Smokeless tobacco: Never Used   Substance and Sexual Activity    Alcohol use: Yes     Alcohol/week: 5.0 standard drinks     Types: 5 Cans of beer per week    Drug use: No    Sexual activity: Not on file   Other Topics Concern    Not on file   Social History Narrative    Not on file     Social Determinants of Health     Financial Resource Strain:     Difficulty of Paying Living Expenses:    Food Insecurity:     Worried About Running Out of Food in the Last Year:     [address] in the Last Year:    Transportation Needs:     Lack of Transportation (Medical):      Lack of Transportation (Non-Medical):    Physical Activity:     Days of Exercise per Week:     Minutes of Exercise per Session:    Stress:     Feeling of Stress :    Social Connections:     Frequency of Communication with Friends and Family:     Frequency of Social Gatherings with Friends and Family:     Attends Taoist Services:     Active Member of Clubs or Organizations:     Attends Club or Organization Meetings:     Marital Status:    Intimate Partner Violence:     Fear of Current or Ex-Partner:     Emotionally Abused:     Physically Abused:     Sexually Abused:      No family history on file. No Known Allergies  Current Outpatient Medications on File Prior to Visit   Medication Sig Dispense Refill    OLANZapine (ZYPREXA) 10 MG tablet Take 1 tablet by mouth nightly 30 tablet 1    divalproex (DEPAKOTE ER) 500 MG extended release tablet Take 2 tablets by mouth daily 30 tablet 3    melatonin 5 MG TBDP disintegrating tablet Take 1 tablet by mouth nightly      Glucosamine 500 MG CAPS Take 1 capsule by mouth daily      mometasone (ELOCON) 0.1 % cream Apply topically twice daily. 45 g 0    Multiple Vitamin (MULTIVITAMIN PO) Take 1 tablet by mouth daily        No current facility-administered medications on file prior to visit. Review of Systems   Constitutional: Negative for activity change, appetite change, chills, diaphoresis, fatigue, fever and unexpected weight change. HENT: Negative for sore throat, trouble swallowing and voice change. Eyes: Negative for visual disturbance. Respiratory: Negative for cough, choking, chest tightness, shortness of breath, wheezing and stridor. Cardiovascular: Negative for chest pain, palpitations and leg swelling. Gastrointestinal: Negative for abdominal distention, abdominal pain, diarrhea, nausea and vomiting. Genitourinary: Negative for difficulty urinating. Musculoskeletal: Negative for gait problem and joint swelling. Skin: Negative for rash and wound.    Neurological: Negative for dizziness, seizures and light-headedness. Hematological: Negative for adenopathy. Does not bruise/bleed easily. Psychiatric/Behavioral: Negative for behavioral problems and confusion. Objective:   Pulse 71   Ht 6' 3\" (1.905 m)   Wt 260 lb (117.9 kg)   SpO2 96%   BMI 32.50 kg/m²     Physical Exam  Constitutional:       General: He is not in acute distress. Appearance: He is not ill-appearing, toxic-appearing or diaphoretic. HENT:      Head: Normocephalic and atraumatic. Nose: Nose normal.   Eyes:      Extraocular Movements: Extraocular movements intact. Conjunctiva/sclera: Conjunctivae normal.      Pupils: Pupils are equal, round, and reactive to light. Neck:      Vascular: No carotid bruit. Cardiovascular:      Rate and Rhythm: Normal rate and regular rhythm. Heart sounds: Normal heart sounds. No murmur heard. No gallop. Pulmonary:      Effort: Pulmonary effort is normal. No respiratory distress. Breath sounds: Normal breath sounds. No wheezing or rales. Abdominal:      General: Abdomen is flat. Bowel sounds are normal.      Palpations: Abdomen is soft. There is no mass. Tenderness: There is no abdominal tenderness. Musculoskeletal:         General: No swelling. Cervical back: Neck supple. Right lower leg: No edema. Left lower leg: No edema. Lymphadenopathy:      Cervical: No cervical adenopathy. Skin:     General: Skin is warm and dry. Neurological:      General: No focal deficit present. Mental Status: He is alert and oriented to person, place, and time. Psychiatric:         Mood and Affect: Mood normal.         Behavior: Behavior normal.         Thought Content: Thought content normal.         Judgment: Judgment normal.               Radiographs and Laboratory Studies:     Diagnostic Imaging Studies:    I personally viewed his CAT scan. He also had a PET scan performed 2 months later and I was able to review this.   There is a small spiculated nodule in the subpleural region in the lateral right lower lobe. Adjacent to this is some pleural thickening consistent with scar from a prior thoracotomy. I do not appreciate any changes from the CAT scan to the PET scan. The PET scan shows no hyperactivity. Assessment/Plan     Possible new spiculated subpleural nodule of the right lower lobe which shows no hyperactivity or changes with 2 months of follow-up. I feel this is low risk for malignancy and is most likely postsurgical from 19 years ago however to be sure I recommended follow-up CAT scans with the first CAT scan being in 4 months. Patient understands and agrees. Diagnosis Orders   1. Pulmonary nodule       No orders of the defined types were placed in this encounter. No orders of the defined types were placed in this encounter. Return in about 4 months (around 10/30/2021) for Surveillance CAT scan.       Medina Chandra MD

## 2021-07-01 ENCOUNTER — OFFICE VISIT (OUTPATIENT)
Dept: PULMONOLOGY | Age: 56
End: 2021-07-01
Payer: COMMERCIAL

## 2021-07-01 VITALS
BODY MASS INDEX: 33.3 KG/M2 | HEIGHT: 75 IN | DIASTOLIC BLOOD PRESSURE: 80 MMHG | SYSTOLIC BLOOD PRESSURE: 130 MMHG | WEIGHT: 267.8 LBS | OXYGEN SATURATION: 98 % | HEART RATE: 59 BPM | RESPIRATION RATE: 16 BRPM | TEMPERATURE: 98 F

## 2021-07-01 DIAGNOSIS — R68.3 CLUBBING OF FINGERS: ICD-10-CM

## 2021-07-01 DIAGNOSIS — R60.0 EDEMA, LOWER EXTREMITY: Primary | ICD-10-CM

## 2021-07-01 DIAGNOSIS — G47.30 SLEEP APNEA, UNSPECIFIED TYPE: ICD-10-CM

## 2021-07-01 DIAGNOSIS — R91.1 LUNG NODULE: ICD-10-CM

## 2021-07-01 PROCEDURE — G8427 DOCREV CUR MEDS BY ELIG CLIN: HCPCS | Performed by: INTERNAL MEDICINE

## 2021-07-01 PROCEDURE — G8417 CALC BMI ABV UP PARAM F/U: HCPCS | Performed by: INTERNAL MEDICINE

## 2021-07-01 PROCEDURE — 1036F TOBACCO NON-USER: CPT | Performed by: INTERNAL MEDICINE

## 2021-07-01 PROCEDURE — 99214 OFFICE O/P EST MOD 30 MIN: CPT | Performed by: INTERNAL MEDICINE

## 2021-07-01 PROCEDURE — 3017F COLORECTAL CA SCREEN DOC REV: CPT | Performed by: INTERNAL MEDICINE

## 2021-07-01 NOTE — PROGRESS NOTES
(Medical):  Lack of Transportation (Non-Medical):    Physical Activity:     Days of Exercise per Week:     Minutes of Exercise per Session:    Stress:     Feeling of Stress :    Social Connections:     Frequency of Communication with Friends and Family:     Frequency of Social Gatherings with Friends and Family:     Attends Jehovah's witness Services:     Active Member of Clubs or Organizations:     Attends Club or Organization Meetings:     Marital Status:    Intimate Partner Violence:     Fear of Current or Ex-Partner:     Emotionally Abused:     Physically Abused:     Sexually Abused:          Review of Systems      ROS: 10 organs review of system is done including general, psychological, ENT, hematological, endocrine, respiratory, cardiovascular, gastrointestinal,musculoskeletal, neurological,  allergy and Immunology is done and is otherwise negative. Current Outpatient Medications   Medication Sig Dispense Refill    OLANZapine (ZYPREXA) 10 MG tablet Take 1 tablet by mouth nightly 30 tablet 1    divalproex (DEPAKOTE ER) 500 MG extended release tablet Take 2 tablets by mouth daily 30 tablet 3    melatonin 5 MG TBDP disintegrating tablet Take 1 tablet by mouth nightly      Glucosamine 500 MG CAPS Take 1 capsule by mouth daily      mometasone (ELOCON) 0.1 % cream Apply topically twice daily. 45 g 0    Multiple Vitamin (MULTIVITAMIN PO) Take 1 tablet by mouth daily        No current facility-administered medications for this visit. Objective:     Vitals:    07/01/21 1415   BP: 130/80   Site: Right Upper Arm   Position: Sitting   Cuff Size: Large Adult   Pulse: 59   Resp: 16   Temp: 98 °F (36.7 °C)   TempSrc: Tympanic   SpO2: 98%   Weight: 267 lb 12.8 oz (121.5 kg)   Height: 6' 2.5\" (1.892 m)         Physical Exam  Constitutional:       Appearance: He is well-developed. HENT:      Head: Normocephalic and atraumatic. Eyes:      General:         Left eye: No discharge. Conjunctiva/sclera: Conjunctivae normal.      Pupils: Pupils are equal, round, and reactive to light. Neck:      Vascular: No JVD. Cardiovascular:      Rate and Rhythm: Normal rate and regular rhythm. Heart sounds: Normal heart sounds. No murmur heard. No friction rub. No gallop. Pulmonary:      Effort: Pulmonary effort is normal. No respiratory distress. Breath sounds: Normal breath sounds. No wheezing or rales. Chest:      Chest wall: No tenderness. Abdominal:      General: Bowel sounds are normal.      Palpations: Abdomen is soft. Musculoskeletal:         General: No tenderness or deformity. Cervical back: Normal range of motion and neck supple. Right lower leg: Edema (trace) present. Left lower leg: Edema (trace) present. Comments: Clubbing   Skin:     General: Skin is warm and dry. Neurological:      Mental Status: He is alert and oriented to person, place, and time. Psychiatric:         Judgment: Judgment normal.         Imaging studies reviewed by me PET CT scan reviewed with the patient, no hypermetabolic lesion, patient has nodule right lower lobe  Lab results reviewed in chart  PFT none  ECHO: None    Assessment and Plan       Diagnosis Orders   1. Edema, lower extremity  ECHO Complete 2D W Doppler W Color   2. Sleep apnea, unspecified type  Home Sleep Study   3. Lung nodule     4. Clubbing of fingers       · Lower extremity edema, etiology is not clear, will obtain echo, evaluate diastolic function, also will obtain sleep study to evaluate MAHENDRA. · Possible MAHENDRA, will obtain home sleep study  · Lung nodule, PET negative, likely residual scar from prior lung surgery, he currently follows up with thoracic surgery he has CT ordered by thoracic surgery for follow-up. · Clubbing of the fingers, PET is negative, no symptoms of chronic respiratory disease at this point.       Orders Placed This Encounter   Procedures    ECHO Complete 2D W Doppler W Color Standing Status:   Future     Standing Expiration Date:   7/1/2022     Order Specific Question:   Reason for exam:     Answer:   edema lower ext    Home Sleep Study     Standing Status:   Future     Standing Expiration Date:   1/1/2023     Order Specific Question:   Location For Sleep Study     Answer:   [facility]     Order Specific Question:   Select Sleep Lab Location     Answer:   [facility]     No orders of the defined types were placed in this encounter. Discussed with patient the importance of exercise and weight control and  overall health and well-being. Reviewed with the patient: current clinical status, medications, activities and diet. Side effects, adverse effects of the medication prescribed today, as well as treatment plan and result expectations have been discussed with the patient who expresses understanding and desires to proceed. Return in about 8 weeks (around 8/26/2021).       Gaudencio Wang MD

## 2023-06-28 NOTE — CARE COORDINATION
FAMILY COLLATERAL NOTE    Family/Support Name: Naima Cotto  Contact #: [phone]  Relationship to Pt[de-identified] brother        Family/Support contact aware of hospitalization: Yes    Presenting Symptoms/Current Concerns:  Brother was honest in reporting that much of the information he was sharing is second hand. 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 She works in a law office. Patient handed a note to the teller stating he was going to kill himself. Top 3 Life Stressors:   Brother stated patient's current stressors are his work with the [employer] at [address] [place] [address]. Patient is assisting with labor difficulties and such. As a result, patient only gets about two hours of sleep. In addition, patient works very long hours for [employer]. The second stressor is related to his marriage of over fifteen years. Patient has been having an affair and his wife found out about it. Background History Relevant to Current Hospitalization:  Per brother, the affair has been ongoing. The woman has been contacting patient's wife by text message. Since patient has been hospitalized, the woman has been sending messages to his phone multiple times per day. The uncovering of the affair by his wife is something patient has been keeping secret from the family. Seemingly this has generated quite a bit of guilt for the patient and possibly the catalyst for his recent actions. Family Mental Health/Substance Use History:   Per brother, there has been some depression in the family but nothing that would require a hospitalization. Support Network's Goal for Hospitalization:   Brother feels patient needs quite a bit of rest. He can't keep going the way he has been going. Brother worries about providing medication to patient because he may be prone to over use of the medication.      Discharge Plan: Patient will return home. Wife wants to fight for her marriage. She has not given up. Support Network Supportive of Discharge Plan: Yes    Support can confirm Safety of Location and Security of Weapons: There was a shotgun in patient's home. Brother removed the weapon when patient was admitted to the hospital.     Support agreeable to Safeguard and Monitor Medications (including Prescription and OTC):   Brother will inform patient's wife to remove all medication so patient has no access when he returns home. Identified Barriers to Compliance with Discharge Plan:   None Identified. Recommendations for Support Network:   Please be available for follow up contact if necessary.        NAHED Sin good balance

## 2024-04-24 ENCOUNTER — OFFICE VISIT (OUTPATIENT)
Dept: SURGERY | Facility: CLINIC | Age: 59
End: 2024-04-24
Payer: COMMERCIAL

## 2024-04-24 VITALS
DIASTOLIC BLOOD PRESSURE: 70 MMHG | BODY MASS INDEX: 32.33 KG/M2 | SYSTOLIC BLOOD PRESSURE: 124 MMHG | WEIGHT: 260 LBS | HEIGHT: 75 IN

## 2024-04-24 DIAGNOSIS — Z86.010 HISTORY OF COLON POLYPS: Primary | ICD-10-CM

## 2024-04-24 PROCEDURE — 99202 OFFICE O/P NEW SF 15 MIN: CPT | Performed by: SURGERY

## 2024-04-24 RX ORDER — HALOPERIDOL 10 MG/1
10 TABLET ORAL DAILY
COMMUNITY

## 2024-04-24 ASSESSMENT — ENCOUNTER SYMPTOMS
ENDOCRINE NEGATIVE: 1
ALLERGIC/IMMUNOLOGIC NEGATIVE: 1
MUSCULOSKELETAL NEGATIVE: 1
CARDIOVASCULAR NEGATIVE: 1
PSYCHIATRIC NEGATIVE: 1
EYES NEGATIVE: 1
GASTROINTESTINAL NEGATIVE: 1
RESPIRATORY NEGATIVE: 1
CONSTITUTIONAL NEGATIVE: 1
HEMATOLOGIC/LYMPHATIC NEGATIVE: 1
NEUROLOGICAL NEGATIVE: 1

## 2024-04-24 NOTE — PROGRESS NOTES
Subjective   Patient ID: Jeffery Andrews is a 58 y.o. male who presents for No chief complaint on file..  HPI  Patient presents to discuss colonoscopy. No complaints at the present time including change of bowel habits, blood in the stool, hemorrhoid complaints, or abdominal pain. No upper abdominal complaints including reflux, dysphagia, heartburn, early satiety. Weight stable.    Last 2019, diminutive polyps.  History of anal fissure status post Botox.    No anemia.    Review of Systems   Constitutional: Negative.    HENT: Negative.     Eyes: Negative.    Respiratory: Negative.     Cardiovascular: Negative.    Gastrointestinal: Negative.    Endocrine: Negative.    Genitourinary: Negative.    Musculoskeletal: Negative.    Skin: Negative.    Allergic/Immunologic: Negative.    Neurological: Negative.    Hematological: Negative.    Psychiatric/Behavioral: Negative.           No past medical history on file.  No past surgical history on file.  No family history on file.   Social History     Socioeconomic History    Marital status: Unknown     Spouse name: Not on file    Number of children: Not on file    Years of education: Not on file    Highest education level: Not on file   Occupational History    Not on file   Tobacco Use    Smoking status: Not on file    Smokeless tobacco: Not on file   Substance and Sexual Activity    Alcohol use: Not on file    Drug use: Not on file    Sexual activity: Not on file   Other Topics Concern    Not on file   Social History Narrative    Not on file     Social Determinants of Health     Financial Resource Strain: Not on file   Food Insecurity: Not on file   Transportation Needs: Not on file   Physical Activity: Not on file   Stress: Not on file   Social Connections: Not on file   Intimate Partner Violence: Not on file   Housing Stability: Not on file        No current outpatient medications on file.     Objective   There were no vitals filed for this visit.   Physical  Exam  Constitutional:       General: He is not in acute distress.     Appearance: Normal appearance. He is normal weight. He is not toxic-appearing or diaphoretic.   HENT:      Head: Normocephalic and atraumatic.   Eyes:      Conjunctiva/sclera: Conjunctivae normal.      Pupils: Pupils are equal, round, and reactive to light.   Cardiovascular:      Rate and Rhythm: Normal rate and regular rhythm.   Pulmonary:      Effort: Pulmonary effort is normal. No respiratory distress.   Abdominal:      General: Abdomen is flat. There is no distension.      Palpations: Abdomen is soft. There is no mass.      Tenderness: There is no abdominal tenderness.      Hernia: No hernia is present.   Musculoskeletal:         General: No swelling.   Skin:     General: Skin is warm and dry.   Neurological:      General: No focal deficit present.      Mental Status: He is alert. Mental status is at baseline.   Psychiatric:         Mood and Affect: Mood normal.         Behavior: Behavior normal.         Thought Content: Thought content normal.         Judgment: Judgment normal.           Assessment/Plan     Patient is scheduled for colonoscopy. Indications for procedure discuss including risks of bleeding, perforation. Details of prep discussed. The patient understands and wishes to proceed    Pallavi Gavin MD

## 2024-06-10 ENCOUNTER — ANESTHESIA EVENT (OUTPATIENT)
Dept: GASTROENTEROLOGY | Facility: HOSPITAL | Age: 59
End: 2024-06-10
Payer: COMMERCIAL

## 2024-06-10 ENCOUNTER — HOSPITAL ENCOUNTER (OUTPATIENT)
Dept: GASTROENTEROLOGY | Facility: HOSPITAL | Age: 59
Discharge: HOME | End: 2024-06-10
Payer: COMMERCIAL

## 2024-06-10 ENCOUNTER — ANESTHESIA (OUTPATIENT)
Dept: GASTROENTEROLOGY | Facility: HOSPITAL | Age: 59
End: 2024-06-10
Payer: COMMERCIAL

## 2024-06-10 VITALS
TEMPERATURE: 97 F | SYSTOLIC BLOOD PRESSURE: 134 MMHG | HEIGHT: 75 IN | HEART RATE: 54 BPM | OXYGEN SATURATION: 94 % | WEIGHT: 273.37 LBS | BODY MASS INDEX: 33.99 KG/M2 | RESPIRATION RATE: 16 BRPM | DIASTOLIC BLOOD PRESSURE: 74 MMHG

## 2024-06-10 DIAGNOSIS — Z86.010 HISTORY OF COLON POLYPS: ICD-10-CM

## 2024-06-10 PROCEDURE — 2500000004 HC RX 250 GENERAL PHARMACY W/ HCPCS (ALT 636 FOR OP/ED): Performed by: STUDENT IN AN ORGANIZED HEALTH CARE EDUCATION/TRAINING PROGRAM

## 2024-06-10 PROCEDURE — 7100000009 HC PHASE TWO TIME - INITIAL BASE CHARGE

## 2024-06-10 PROCEDURE — 3700000001 HC GENERAL ANESTHESIA TIME - INITIAL BASE CHARGE

## 2024-06-10 PROCEDURE — 45380 COLONOSCOPY AND BIOPSY: CPT | Performed by: SURGERY

## 2024-06-10 PROCEDURE — 2500000004 HC RX 250 GENERAL PHARMACY W/ HCPCS (ALT 636 FOR OP/ED)

## 2024-06-10 PROCEDURE — 3700000002 HC GENERAL ANESTHESIA TIME - EACH INCREMENTAL 1 MINUTE

## 2024-06-10 PROCEDURE — 7100000010 HC PHASE TWO TIME - EACH INCREMENTAL 1 MINUTE

## 2024-06-10 PROCEDURE — 2500000005 HC RX 250 GENERAL PHARMACY W/O HCPCS

## 2024-06-10 PROCEDURE — 2500000001 HC RX 250 WO HCPCS SELF ADMINISTERED DRUGS (ALT 637 FOR MEDICARE OP): Performed by: SURGERY

## 2024-06-10 RX ORDER — PROPOFOL 10 MG/ML
INJECTION, EMULSION INTRAVENOUS CONTINUOUS PRN
Status: DISCONTINUED | OUTPATIENT
Start: 2024-06-10 | End: 2024-06-10

## 2024-06-10 RX ORDER — DEXTROMETHORPHAN/PSEUDOEPHED 2.5-7.5/.8
DROPS ORAL AS NEEDED
Status: COMPLETED | OUTPATIENT
Start: 2024-06-10 | End: 2024-06-10

## 2024-06-10 RX ORDER — SODIUM CHLORIDE, SODIUM LACTATE, POTASSIUM CHLORIDE, CALCIUM CHLORIDE 600; 310; 30; 20 MG/100ML; MG/100ML; MG/100ML; MG/100ML
50 INJECTION, SOLUTION INTRAVENOUS CONTINUOUS
Status: DISCONTINUED | OUTPATIENT
Start: 2024-06-10 | End: 2024-06-11 | Stop reason: HOSPADM

## 2024-06-10 RX ORDER — LIDOCAINE HYDROCHLORIDE 20 MG/ML
INJECTION, SOLUTION INFILTRATION; PERINEURAL AS NEEDED
Status: DISCONTINUED | OUTPATIENT
Start: 2024-06-10 | End: 2024-06-10

## 2024-06-10 RX ORDER — PROPOFOL 10 MG/ML
INJECTION, EMULSION INTRAVENOUS AS NEEDED
Status: DISCONTINUED | OUTPATIENT
Start: 2024-06-10 | End: 2024-06-10

## 2024-06-10 RX ORDER — BISMUTH SUBSALICYLATE 262 MG
1 TABLET,CHEWABLE ORAL DAILY
COMMUNITY

## 2024-06-10 RX ADMIN — SIMETHICONE 40 MG: 20 EMULSION ORAL at 10:29

## 2024-06-10 RX ADMIN — SODIUM CHLORIDE, POTASSIUM CHLORIDE, SODIUM LACTATE AND CALCIUM CHLORIDE 50 ML/HR: 600; 310; 30; 20 INJECTION, SOLUTION INTRAVENOUS at 08:49

## 2024-06-10 RX ADMIN — LIDOCAINE HYDROCHLORIDE 50 MG: 20 INJECTION, SOLUTION INFILTRATION; PERINEURAL at 10:18

## 2024-06-10 RX ADMIN — PROPOFOL 120 MG: 10 INJECTION, EMULSION INTRAVENOUS at 10:18

## 2024-06-10 RX ADMIN — PROPOFOL 50 MG: 10 INJECTION, EMULSION INTRAVENOUS at 10:19

## 2024-06-10 RX ADMIN — PROPOFOL 100 MCG/KG/MIN: 10 INJECTION, EMULSION INTRAVENOUS at 10:19

## 2024-06-10 SDOH — HEALTH STABILITY: MENTAL HEALTH: CURRENT SMOKER: 0

## 2024-06-10 ASSESSMENT — ENCOUNTER SYMPTOMS
ALLERGIC/IMMUNOLOGIC NEGATIVE: 1
CONSTITUTIONAL NEGATIVE: 1
ENDOCRINE NEGATIVE: 1
EYES NEGATIVE: 1
GASTROINTESTINAL NEGATIVE: 1
RESPIRATORY NEGATIVE: 1
PSYCHIATRIC NEGATIVE: 1
HEMATOLOGIC/LYMPHATIC NEGATIVE: 1
CARDIOVASCULAR NEGATIVE: 1
NEUROLOGICAL NEGATIVE: 1
MUSCULOSKELETAL NEGATIVE: 1

## 2024-06-10 ASSESSMENT — PAIN - FUNCTIONAL ASSESSMENT
PAIN_FUNCTIONAL_ASSESSMENT: 0-10

## 2024-06-10 ASSESSMENT — COLUMBIA-SUICIDE SEVERITY RATING SCALE - C-SSRS
2. HAVE YOU ACTUALLY HAD ANY THOUGHTS OF KILLING YOURSELF?: NO
1. IN THE PAST MONTH, HAVE YOU WISHED YOU WERE DEAD OR WISHED YOU COULD GO TO SLEEP AND NOT WAKE UP?: NO
6. HAVE YOU EVER DONE ANYTHING, STARTED TO DO ANYTHING, OR PREPARED TO DO ANYTHING TO END YOUR LIFE?: NO

## 2024-06-10 ASSESSMENT — PAIN SCALES - GENERAL
PAINLEVEL_OUTOF10: 0 - NO PAIN

## 2024-06-10 NOTE — ANESTHESIA POSTPROCEDURE EVALUATION
Patient: Jeffery Andrews    Procedure Summary       Date: 06/10/24 Room / Location: [facility]    Anesthesia Start: 1014 Anesthesia Stop:     Procedure: COLONOSCOPY Diagnosis: History of colon polyps    Scheduled Providers: Pallavi Gavin MD; Anders Chaney DO; Danielle Denny RN; Valerie Covarrubisa Responsible Provider: Anders Chaney DO    Anesthesia Type: MAC ASA Status: 2            Anesthesia Type: MAC    Vitals Value Taken Time   /72 06/10/24 1042   Temp - 06/10/24 1042   Pulse 69 06/10/24 1042   Resp 16 06/10/24 1042   SpO2 96 06/10/24 1042       Anesthesia Post Evaluation    Patient location during evaluation: PACU  Patient participation: complete - patient participated  Level of consciousness: awake  Pain management: adequate  Multimodal analgesia pain management approach  Airway patency: patent  Cardiovascular status: acceptable  Respiratory status: acceptable  Hydration status: acceptable  Postoperative Nausea and Vomiting: none        No notable events documented.

## 2024-06-10 NOTE — ANESTHESIA PREPROCEDURE EVALUATION
Jeffery Andrews is a 58 y.o. male here for:    Colonoscopy  With Pallavi Gavin MD  History of colon polyps    Relevant Problems   No relevant active problems       Lab Results   Component Value Date    HGB 15.3 06/07/2019    HCT 44.6 06/07/2019    WBC 4.7 06/07/2019     06/07/2019     06/07/2019    K 4.1 06/07/2019     06/07/2019    CREATININE 0.72 06/07/2019    BUN 8 06/07/2019       Social History     Tobacco Use   Smoking Status Never   Smokeless Tobacco Never       No Known Allergies    Current Outpatient Medications   Medication Instructions    haloperidol (HALDOL) 10 mg, oral, Daily       Past Surgical History:   Procedure Laterality Date    ABDOMINAL HERNIA REPAIR      HERNIA REPAIR      inguinal    LUNG SURGERY Right     Large Tumor removed-benign       No family history on file.    NPO Details:  No data recorded    Physical Exam    Airway  Mallampati: II  TM distance: >3 FB  Neck ROM: full     Cardiovascular - normal exam     Dental - normal exam     Pulmonary - normal exam     Abdominal            Anesthesia Plan    History of general anesthesia?: yes  History of complications of general anesthesia?: no    ASA 2     MAC     The patient is not a current smoker.    intravenous induction   Anesthetic plan and risks discussed with patient.    Plan discussed with CRNA.

## 2024-06-10 NOTE — Clinical Note
Patient tolerated procedure well. Appears comfortable with no complaints of pain. VS stable. Arousable prior to transport. Patient transported to [facility] via cart.  Report called              . Handoff completed

## 2024-06-10 NOTE — Clinical Note
Huddle and Timeout completed together with team. Patient wristband and ALYSSA information verified.  Anesthesia safety check completed

## 2024-06-10 NOTE — DISCHARGE INSTRUCTIONS
Patient Instructions after a Colonoscopy      The anesthetics, sedatives or narcotics which were given to you today will be acting in your body for the next 24 hours, so you might feel a little sleepy or groggy.  This feeling should slowly wear off. Carefully read and follow the instructions.     You received sedation today:  - Do not drive or operate any machinery or power tools of any kind.   - No alcoholic beverages today, not even beer or wine.  - Do not make any important decisions or sign any legal documents.  - No over the counter medications that contain alcohol or that may cause drowsiness.  - Do not make any important decisions or sign any legal documents.    While it is common to experience mild to moderate abdominal distention, gas, or belching after your procedure, if any of these symptoms occur following discharge from the GI Lab or within one week of having your procedure, call the [facility] to be advised whether a visit to your nearest Urgent Care or Emergency Department is indicated.  Take this paper with you if you go.     - If you develop an allergic reaction to the medications that were given during your procedure such as difficulty breathing, rash, hives, severe nausea, vomiting or lightheadedness.  - If you experience chest pain, shortness of breath, severe abdominal pain, fevers and chills.  -If you develop signs and symptoms of bleeding such as blood in your spit, if your stools turn black, tarry, or bloody  - If you have not urinated within 8 hours following your procedure.  - If your IV site becomes painful, red, inflamed, or looks infected.    If you received a biopsy/polypectomy/sphincterotomy the following instructions apply below:    __ Do not use Aspirin containing products, non-steroidal medications or anti-coagulants for one week following your procedure. (Examples of these types of medications are: Advil, Arthrotec, Aleve, Coumadin, Ecotrin, Heparin, Ibuprofen,  Indocin, Motrin, Naprosyn, Nuprin, Plavix, Vioxx, and Voltarin, or their generic forms.  This list is not all-inclusive.  Check with your physician or pharmacist before resuming medications.)   __ Eat a soft diet today.  Avoid foods that are poorly digested for the next 24 hours.  These foods would include: nuts, beans, lettuce, red meats, and fried foods. Start with liquids and advance your diet as tolerated, gradually work up to eating solids.   __ Do not have a Barium Study or Enema for one week.    Your physician recommends the additional following instructions:    -You have a contact number available for emergencies. The signs and symptoms of potential delayed complications were discussed with you. You may return to normal activities tomorrow.  -Resume your previous diet.  -Continue your present medications.   -We are waiting for your pathology results.  -Your physician has recommended a repeat colonoscopy (date to be determined after pending pathology results are reviewed) for surveillance based on pathology results.  -The findings and recommendations have been discussed with you.  -The findings and recommendations were discussed with your family.  - Please see Medication Reconciliation Form for new medication/medications prescribed.       If you experience any problems or have any questions following discharge from the GI Lab, please call:  Before 5p.m.  [phone]  After 5p.m.    [phone]    Nurse Signature                                                                        Date___________________                                                                            Patient/Responsible Party Signature                                        Date___________________

## 2024-06-10 NOTE — H&P
History Of Present Illness  Jeffery Andrews is a 58 y.o. male presenting with history of polyps.     Past Medical History  Past Medical History:   Diagnosis Date    Bipolar disorder (Multi)     Depression        Surgical History  Past Surgical History:   Procedure Laterality Date    ABDOMINAL HERNIA REPAIR      HERNIA REPAIR      inguinal    LIPOMA RESECTION  2012    back    LUNG SURGERY Right     Large Tumor removed-benign        Social History  He reports that he has never smoked. He has never used smokeless tobacco. He reports current alcohol use. He reports that he does not use drugs.    Family History  No family history on file.     Allergies  Patient has no known allergies.    Review of Systems   Constitutional: Negative.    HENT: Negative.     Eyes: Negative.    Respiratory: Negative.     Cardiovascular: Negative.    Gastrointestinal: Negative.    Endocrine: Negative.    Genitourinary: Negative.    Musculoskeletal: Negative.    Skin: Negative.    Allergic/Immunologic: Negative.    Neurological: Negative.    Hematological: Negative.    Psychiatric/Behavioral: Negative.          Physical Exam  Constitutional:       General: He is not in acute distress.     Appearance: Normal appearance. He is normal weight. He is not toxic-appearing or diaphoretic.   HENT:      Head: Normocephalic and atraumatic.   Eyes:      Conjunctiva/sclera: Conjunctivae normal.      Pupils: Pupils are equal, round, and reactive to light.   Cardiovascular:      Rate and Rhythm: Normal rate and regular rhythm.   Pulmonary:      Effort: Pulmonary effort is normal. No respiratory distress.   Abdominal:      General: Abdomen is flat. There is no distension.      Palpations: Abdomen is soft. There is no mass.      Tenderness: There is no abdominal tenderness.      Hernia: No hernia is present.   Musculoskeletal:         General: No swelling.   Skin:     General: Skin is warm and dry.   Neurological:      General: No focal deficit present.       "Mental Status: He is alert. Mental status is at baseline.   Psychiatric:         Mood and Affect: Mood normal.         Behavior: Behavior normal.         Thought Content: Thought content normal.         Judgment: Judgment normal.          Last Recorded Vitals  Blood pressure 125/75, pulse 63, temperature 36.4 °C (97.5 °F), temperature source Temporal, resp. rate 16, height 1.905 m (6' 3\"), weight 124 kg (273 lb 5.9 oz), SpO2 93%.    Relevant Results             Assessment/Plan   Active Problems:  There are no active Hospital Problems.      Colonoscopy       I spent  minutes in the professional and overall care of this patient.      Pallavi Gavin MD    "

## 2024-06-17 LAB
LABORATORY COMMENT REPORT: NORMAL
PATH REPORT.FINAL DX SPEC: NORMAL
PATH REPORT.GROSS SPEC: NORMAL
PATH REPORT.TOTAL CANCER: NORMAL